# Patient Record
Sex: FEMALE | Race: WHITE | NOT HISPANIC OR LATINO | Employment: FULL TIME | URBAN - METROPOLITAN AREA
[De-identification: names, ages, dates, MRNs, and addresses within clinical notes are randomized per-mention and may not be internally consistent; named-entity substitution may affect disease eponyms.]

---

## 2019-05-31 ENCOUNTER — OFFICE VISIT (OUTPATIENT)
Dept: URGENT CARE | Facility: URGENT CARE | Age: 54
End: 2019-05-31
Payer: COMMERCIAL

## 2019-05-31 VITALS
DIASTOLIC BLOOD PRESSURE: 72 MMHG | OXYGEN SATURATION: 98 % | SYSTOLIC BLOOD PRESSURE: 111 MMHG | RESPIRATION RATE: 12 BRPM | HEART RATE: 62 BPM | WEIGHT: 164 LBS | TEMPERATURE: 98 F

## 2019-05-31 DIAGNOSIS — R30.0 DYSURIA: ICD-10-CM

## 2019-05-31 DIAGNOSIS — R82.90 NONSPECIFIC FINDING ON EXAMINATION OF URINE: ICD-10-CM

## 2019-05-31 DIAGNOSIS — R31.9 URINARY TRACT INFECTION WITH HEMATURIA, SITE UNSPECIFIED: Primary | ICD-10-CM

## 2019-05-31 DIAGNOSIS — N39.0 URINARY TRACT INFECTION WITH HEMATURIA, SITE UNSPECIFIED: Primary | ICD-10-CM

## 2019-05-31 LAB
ALBUMIN UR-MCNC: >=300 MG/DL
APPEARANCE UR: ABNORMAL
BACTERIA #/AREA URNS HPF: ABNORMAL /HPF
BILIRUB UR QL STRIP: ABNORMAL
COLOR UR AUTO: ABNORMAL
GLUCOSE UR STRIP-MCNC: NEGATIVE MG/DL
HGB UR QL STRIP: ABNORMAL
KETONES UR STRIP-MCNC: NEGATIVE MG/DL
LEUKOCYTE ESTERASE UR QL STRIP: ABNORMAL
NITRATE UR QL: POSITIVE
NON-SQ EPI CELLS #/AREA URNS LPF: ABNORMAL /LPF
PH UR STRIP: 6.5 PH (ref 5–7)
RBC #/AREA URNS AUTO: >100 /HPF
SOURCE: ABNORMAL
SP GR UR STRIP: 1.01 (ref 1–1.03)
UROBILINOGEN UR STRIP-ACNC: 0.2 EU/DL (ref 0.2–1)
WBC #/AREA URNS AUTO: ABNORMAL /HPF

## 2019-05-31 PROCEDURE — 99203 OFFICE O/P NEW LOW 30 MIN: CPT | Performed by: PHYSICIAN ASSISTANT

## 2019-05-31 PROCEDURE — 87186 SC STD MICRODIL/AGAR DIL: CPT | Performed by: PHYSICIAN ASSISTANT

## 2019-05-31 PROCEDURE — 81001 URINALYSIS AUTO W/SCOPE: CPT | Performed by: PHYSICIAN ASSISTANT

## 2019-05-31 PROCEDURE — 87086 URINE CULTURE/COLONY COUNT: CPT | Performed by: PHYSICIAN ASSISTANT

## 2019-05-31 PROCEDURE — 87088 URINE BACTERIA CULTURE: CPT | Performed by: PHYSICIAN ASSISTANT

## 2019-05-31 RX ORDER — NITROFURANTOIN 25; 75 MG/1; MG/1
100 CAPSULE ORAL 2 TIMES DAILY
Qty: 14 CAPSULE | Refills: 0 | Status: SHIPPED | OUTPATIENT
Start: 2019-05-31 | End: 2019-06-20

## 2019-05-31 RX ORDER — DIPHENOXYLATE HYDROCHLORIDE AND ATROPINE SULFATE 2.5; .025 MG/1; MG/1
TABLET ORAL
COMMUNITY
Start: 2008-03-26

## 2019-06-01 NOTE — PROGRESS NOTES
S: 52 yo female is here for dysuria, urinary frequency, urinary urgency, and pressure sensation for a couple days.  Blood in her urine today.  LMP years ago.  No fever.  No rash.  No nausea or vomiting.  No flank pain.  No pelvic pain.        No Known Allergies    History reviewed. No pertinent past medical history.     PMHX-not diabetic    FMHX-negative for kidney stones      Current Outpatient Medications on File Prior to Visit:  FLUoxetine (PROZAC) 20 MG capsule    Multiple Vitamin (MULTI-VITAMINS) TABS      No current facility-administered medications on file prior to visit.     Social History     Tobacco Use     Smoking status: Never Smoker     Smokeless tobacco: Never Used   Substance Use Topics     Alcohol use: Not on file       ROS:  CONSTITUTIONAL: Negative for fatigue or fever.  EYES: Negative for eye problems.  ENT: neg.  RESP: neg.  CV: Negative for chest pains.  GI: Negative for vomiting.  MUSCULOSKELETAL:  Negative for significant muscle or joint pains.  NEUROLOGIC: Negative for headaches.  SKIN: Negative for rash.  - as above  Psych- nl mentation    OBJECTIVE:  /72   Pulse 62   Temp 98  F (36.7  C) (Oral)   Resp 12   Wt 74.4 kg (164 lb)   SpO2 98%   GENERAL APPEARANCE: Healthy, alert and no distress.  EYES:Conjunctiva/sclera clear.  THROAT: No erythema w/o tonsillar enlargement . No exudates.  NECK: Supple, nontender, no lymphadenopathy.  RESP: Lungs clear to auscultation - no rales, rhonchi or wheezes  CV: Regular rate and rhythm, normal S1 S2, no murmur noted.  NEURO: Awake, alert    SKIN: No rashes  Abdomen-soft, nontender.  Back-no CVA tenderness  Results for orders placed or performed in visit on 05/31/19   *UA reflex to Microscopic and Culture ([facility] (except [place] and [place])   Result Value Ref Range    Color Urine Brown     Appearance Urine Bloody     Glucose Urine Negative NEG^Negative mg/dL    Bilirubin Urine Small (A) NEG^Negative    Ketones Urine  Negative NEG^Negative mg/dL    Specific Gravity Urine 1.010 1.003 - 1.035    Blood Urine Large (A) NEG^Negative    pH Urine 6.5 5.0 - 7.0 pH    Protein Albumin Urine >=300 (A) NEG^Negative mg/dL    Urobilinogen Urine 0.2 0.2 - 1.0 EU/dL    Nitrite Urine Positive (A) NEG^Negative    Leukocyte Esterase Urine Small (A) NEG^Negative    Source Midstream Urine    Urine Microscopic   Result Value Ref Range    WBC Urine 25-50 (A) OTO5^0 - 5 /HPF    RBC Urine >100 (A) OTO2^O - 2 /HPF    Squamous Epithelial /LPF Urine Few FEW^Few /LPF    Bacteria Urine Few (A) NEG^Negative /HPF         ASSESSMENT:     ICD-10-CM    1. Urinary tract infection with hematuria, site unspecified N39.0 nitroFURantoin macrocrystal-monohydrate (MACROBID) 100 MG capsule    R31.9    2. Dysuria R30.0 *UA reflex to Microscopic and Culture ([facility] (except [place] and [place])     Urine Microscopic   3. Nonspecific finding on examination of urine R82.90 Urine Culture Aerobic Bacterial           PLAN: pending  Lots of rest and fluids.  RETURN TO CLINIC 3 days if any worsening symptoms or if not improving.    Opal Darden PA-C

## 2019-06-02 LAB
BACTERIA SPEC CULT: ABNORMAL
SPECIMEN SOURCE: ABNORMAL

## 2019-06-18 ENCOUNTER — TRANSFERRED RECORDS (OUTPATIENT)
Dept: HEALTH INFORMATION MANAGEMENT | Facility: CLINIC | Age: 54
End: 2019-06-18

## 2019-06-18 LAB — PAP SMEAR - HIM PATIENT REPORTED: NEGATIVE

## 2019-06-20 ENCOUNTER — ANCILLARY PROCEDURE (OUTPATIENT)
Dept: GENERAL RADIOLOGY | Facility: CLINIC | Age: 54
End: 2019-06-20
Attending: PHYSICIAN ASSISTANT
Payer: COMMERCIAL

## 2019-06-20 ENCOUNTER — OFFICE VISIT (OUTPATIENT)
Dept: FAMILY MEDICINE | Facility: CLINIC | Age: 54
End: 2019-06-20
Payer: COMMERCIAL

## 2019-06-20 VITALS
BODY MASS INDEX: 27.32 KG/M2 | WEIGHT: 164 LBS | SYSTOLIC BLOOD PRESSURE: 95 MMHG | DIASTOLIC BLOOD PRESSURE: 61 MMHG | HEIGHT: 65 IN | HEART RATE: 73 BPM | OXYGEN SATURATION: 99 % | TEMPERATURE: 98.2 F | RESPIRATION RATE: 18 BRPM

## 2019-06-20 DIAGNOSIS — S69.90XA FINGER INJURY, UNSPECIFIED LATERALITY, INITIAL ENCOUNTER: ICD-10-CM

## 2019-06-20 DIAGNOSIS — S62.623A DISPLACED FRACTURE OF MIDDLE PHALANX OF LEFT MIDDLE FINGER, INITIAL ENCOUNTER FOR CLOSED FRACTURE: ICD-10-CM

## 2019-06-20 DIAGNOSIS — S62.630A CLOSED DISPLACED FRACTURE OF DISTAL PHALANX OF RIGHT INDEX FINGER, INITIAL ENCOUNTER: Primary | ICD-10-CM

## 2019-06-20 PROCEDURE — 73140 X-RAY EXAM OF FINGER(S): CPT | Mod: RT

## 2019-06-20 PROCEDURE — 73140 X-RAY EXAM OF FINGER(S): CPT | Mod: LT

## 2019-06-20 PROCEDURE — 99214 OFFICE O/P EST MOD 30 MIN: CPT | Performed by: PHYSICIAN ASSISTANT

## 2019-06-20 SDOH — HEALTH STABILITY: MENTAL HEALTH: HOW OFTEN DO YOU HAVE A DRINK CONTAINING ALCOHOL?: NEVER

## 2019-06-20 ASSESSMENT — MIFFLIN-ST. JEOR: SCORE: 1344.78

## 2019-06-20 ASSESSMENT — PAIN SCALES - GENERAL: PAINLEVEL: MILD PAIN (3)

## 2019-06-20 NOTE — Clinical Note
Please abstract the following data from this visit with this patient into the appropriate field in Epic:Colonoscopy done on this date: 2015 , by this group: [facility], results were normal repeat in 10 years. Mammogram done on this date: 04/2019 , by this group: kael Mueller, results were normal. Pap smear done on this date: 6/18/19  by this group: Kael Mueller , results were normal, negative HPV repeat in 5 years.

## 2019-06-20 NOTE — PATIENT INSTRUCTIONS
At [facility], we strive to deliver an exceptional experience to you, every time we see you.  If you receive a survey in the mail, please send us back your thoughts. We really do value your feedback.    Based on your medical history, these are the current health maintenance/preventive care services that you are due for (some may have been done at this visit.)  Health Maintenance Due   Topic Date Due     PREVENTIVE CARE VISIT  1965     HEPATITIS C SCREENING  1965     MAMMO SCREENING  1965     PAP  1965     COLONOSCOPY  11/11/1974     HIV SCREENING  11/11/1979     LIPID  11/11/2009     ZOSTER IMMUNIZATION (1 of 2) 11/11/2014     PHQ-2  01/01/2019         Suggested websites for health information:  Www.Pancetera.inmobly : Up to date and easily searchable information on multiple topics.  Www.medlineplus.gov : medication info, interactive tutorials, watch real surgeries online  Www.familydoctor.org : good info from the Academy of Family Physicians  Www.cdc.gov : public health info, travel advisories, epidemics (H1N1)  Www.aap.org : children's health info, normal development, vaccinations  Www.health.[facility].[place].us : [place] dept of health, public health issues in [place], N1N1    Your care team:                            Family Medicine Internal Medicine   MD Ben Drummond MD Shantel Branch-Fleming, MD Katya Georgiev PA-C Nam Ho, MD Pediatrics   CHRIS Park, MD Preeti Sanchez CNP, MD Deborah Mielke, MD Kim Thein, APRMARLON GONZALEZ      Clinic hours: Monday - Thursday 7 am-7 pm; Fridays 7 am-5 pm.   Urgent care: Monday - Friday 11 am-9 pm; Saturday and Sunday 9 am-5 pm.  Pharmacy : Monday -Thursday 8 am-8 pm; Friday 8 am-6 pm; Saturday and Sunday 9 am-5 pm.     Clinic: [phone]   Pharmacy: [phone]

## 2019-06-20 NOTE — PROGRESS NOTES
"Subjective     Rosalba Arredondo is a 53 year old female who presents to clinic today for the following health issues:    HPI   Concern - Hand injury  Onset: 6/8/19    Description:   Folding metal ladder fell and hit both hands and fingers got stuck.    Intensity: mild    Progression of Symptoms:  improving    Accompanying Signs & Symptoms:  Tingling, swelling, burning to the touch left finger    Previous history of similar problem:   none    Precipitating factors:   Worsened by: pressure    Alleviating factors:  Improved by: splint bought from [facility], ibuprofen    Therapies Tried and outcome: splint bought from [facility], ibuprofen        There is no problem list on file for this patient.    History reviewed. No pertinent surgical history.    Social History     Tobacco Use     Smoking status: Never Smoker     Smokeless tobacco: Never Used   Substance Use Topics     Alcohol use: Yes     Frequency: Never     Comment: occasionally     History reviewed. No pertinent family history.      Current Outpatient Medications   Medication Sig Dispense Refill     FLUoxetine (PROZAC) 20 MG capsule   2     Multiple Vitamin (MULTI-VITAMINS) TABS        No Known Allergies    Reviewed and updated as needed this visit by Provider         Review of Systems   ROS COMP: Constitutional, HEENT, cardiovascular, pulmonary, gi and gu systems are negative, except as otherwise noted.      Objective    BP 95/61 (BP Location: Right arm, Patient Position: Chair, Cuff Size: Adult Regular)   Pulse 73   Temp 98.2  F (36.8  C) (Oral)   Resp 18   Ht 1.651 m (5' 5\")   Wt 74.4 kg (164 lb)   SpO2 99%   BMI 27.29 kg/m    Body mass index is 27.29 kg/m .  Physical Exam   GENERAL: healthy, alert and no distress  MS: right index finger-swelling, erythema, tenderness at the distal phalanx  Left middle finger-swelling erythema tenderness at the middle phalanx, LROMDIP    Diagnostic Test Results:  Labs reviewed in Epic  Xray - independent read-displaced " "fracture of the right index distal phalanx and left middle finger middle phalanx        Assessment & Plan       ICD-10-CM    1. Closed displaced fracture of distal phalanx of right index finger, initial encounter S62.630A XR Finger Left G/E 2 Views     XR Finger Right G/E 2 Views     ORTHO  REFERRAL   2. Displaced fracture of middle phalanx of left middle finger, initial encounter for closed fracture S62.623A ORTHO  REFERRAL   wear splints  Ibuprofen  Icing  Make appointment with ortho as soon as possible      BMI:   Estimated body mass index is 27.29 kg/m  as calculated from the following:    Height as of this encounter: 1.651 m (5' 5\").    Weight as of this encounter: 74.4 kg (164 lb).   Weight management plan: Discussed healthy diet and exercise guidelines            No follow-ups on file.    Ashley Edouard PA-C  [facility]    "

## 2019-06-24 ENCOUNTER — OFFICE VISIT (OUTPATIENT)
Dept: ORTHOPEDICS | Facility: CLINIC | Age: 54
End: 2019-06-24
Payer: COMMERCIAL

## 2019-06-24 VITALS
RESPIRATION RATE: 13 BRPM | OXYGEN SATURATION: 99 % | HEART RATE: 75 BPM | SYSTOLIC BLOOD PRESSURE: 116 MMHG | DIASTOLIC BLOOD PRESSURE: 75 MMHG | BODY MASS INDEX: 27.32 KG/M2 | WEIGHT: 164 LBS | HEIGHT: 65 IN

## 2019-06-24 DIAGNOSIS — S62.660A CLOSED NONDISPLACED FRACTURE OF DISTAL PHALANX OF RIGHT INDEX FINGER, INITIAL ENCOUNTER: ICD-10-CM

## 2019-06-24 DIAGNOSIS — S62.623A DISPLACED FRACTURE OF MIDDLE PHALANX OF LEFT MIDDLE FINGER, INITIAL ENCOUNTER FOR CLOSED FRACTURE: Primary | ICD-10-CM

## 2019-06-24 DIAGNOSIS — S62.623A CLOSED DISPLACED FRACTURE OF MIDDLE PHALANX OF LEFT MIDDLE FINGER, INITIAL ENCOUNTER: ICD-10-CM

## 2019-06-24 PROBLEM — S62.660D CLOSED NONDISPLACED FRACTURE OF DISTAL PHALANX OF RIGHT INDEX FINGER WITH ROUTINE HEALING: Status: ACTIVE | Noted: 2019-06-24

## 2019-06-24 PROCEDURE — 99243 OFF/OP CNSLTJ NEW/EST LOW 30: CPT | Performed by: ORTHOPAEDIC SURGERY

## 2019-06-24 RX ORDER — CEFAZOLIN SODIUM 1 G/50ML
1 INJECTION, SOLUTION INTRAVENOUS SEE ADMIN INSTRUCTIONS
Status: CANCELLED | OUTPATIENT
Start: 2019-06-24

## 2019-06-24 RX ORDER — CEFAZOLIN SODIUM 2 G/50ML
2 SOLUTION INTRAVENOUS
Status: CANCELLED | OUTPATIENT
Start: 2019-06-24

## 2019-06-24 ASSESSMENT — MIFFLIN-ST. JEOR: SCORE: 1344.78

## 2019-06-24 NOTE — LETTER
6/24/2019         RE: Rosalba Arredondo  [address] [place] [address]        Dear Colleague,    Thank you for referring your patient, Rosalba Arredondo, to the [facility]. Please see a copy of my visit note below.    Roslaba Arredondo is a 53 year old female who is seen in consultation at the request of Ashley Edouard PA-C  for right index and left long finger fractures.  She sustained this when her hands were caught in a folding ladder on 6/8/19.  She was hoping she just bruised the fingers, so did not seek medical attention. She has constant pain in the fingers rated 4/10. She is right handed.    X-ray 6/20/19 showed the fractures.  Right index distal phalanx fracture is nondisplaced.  Left long finger middle phalanx  fracture is displaced with the shaft blocking the DIP range of motion.    History reviewed. No pertinent past medical history.    History reviewed. No pertinent surgical history.    History reviewed. No pertinent family history.    Social History     Socioeconomic History     Marital status:      Spouse name: Not on file     Number of children: Not on file     Years of education: Not on file     Highest education level: Not on file   Occupational History     Not on file   Social Needs     Financial resource strain: Not on file     Food insecurity:     Worry: Not on file     Inability: Not on file     Transportation needs:     Medical: Not on file     Non-medical: Not on file   Tobacco Use     Smoking status: Never Smoker     Smokeless tobacco: Never Used   Substance and Sexual Activity     Alcohol use: Yes     Frequency: Never     Comment: occasionally     Drug use: Never     Sexual activity: Yes     Partners: Male     Birth control/protection: None   Lifestyle     Physical activity:     Days per week: Not on file     Minutes per session: Not on file     Stress: Not on file   Relationships     Social connections:     Talks on phone: Not on file     Gets together: Not on  "file     Attends Denominational service: Not on file     Active member of club or organization: Not on file     Attends meetings of clubs or organizations: Not on file     Relationship status: Not on file     Intimate partner violence:     Fear of current or ex partner: Not on file     Emotionally abused: Not on file     Physically abused: Not on file     Forced sexual activity: Not on file   Other Topics Concern     Not on file   Social History Narrative     Not on file       Current Outpatient Medications   Medication Sig Dispense Refill     FLUoxetine (PROZAC) 20 MG capsule   2     Multiple Vitamin (MULTI-VITAMINS) TABS          No Known Allergies    REVIEW OF SYSTEMS:  CONSTITUTIONAL:  NEGATIVE for fever, chills, change in weight, not feeling tired  SKIN:  NEGATIVE for worrisome rashes, no skin lumps, no skin ulcers and no non-healing wounds  EYES:  NEGATIVE for vision changes or irritation.  ENT/MOUTH:  NEGATIVE.  No hearing loss, no hoarseness, no difficulty swallowing.  RESP:  NEGATIVE. No cough or shortness of breath.  CV:  NEGATIVE for chest pain, palpitations or peripheral edema  GI:  NEGATIVE for nausea, abdominal pain, heartburn, or change in bowel habits  :  Negative. No dysuria, no hematuria  MUSCULOSKELETAL:  See HPI above  NEURO:  NEGATIVE . No headaches, no dizziness,  no numbness  ENDOCRINE:  NEGATIVE for temperature intolerance, skin/hair changes  HEME/ALLERGY/IMMUNE:  NEGATIVE for bleeding problems  PSYCHIATRIC:  NEGATIVE. no anxiety, no depression.     Exam:  Vitals: /75   Pulse 75   Resp 13   Ht 1.651 m (5' 5\")   Wt 74.4 kg (164 lb)   SpO2 99%   BMI 27.29 kg/m     BMI= Body mass index is 27.29 kg/m .  Constitutional:  healthy, alert and no distress  Neuro: Alert and Oriented x 3, Sensation grossly WNL.  Psych: Affect normal   Respiratory: Breathing not labored.  Cardiovascular: normal peripheral pulses  Lymph: no adenopathy  Skin: No rashes,worrisome lesions or skin problems  Right " index has tenderness at distal phalanx.  No deformity.  Left long finger has swan neck deformity.  I can extend the DIP, but she has limited flexion at DIP.  Lacks good active control.  Very tender at middle phalanx.    Assessment:    1.  Right index distal phalanx fracture in good position and healing well.  She can stop this splint in 1 week.  2.  Left long finger middle phalanx fracture is displaced and with deformity.  At 16 days this already has early healing with malunion.  I feel it requires open-reduction, internal fixation to gain good range of motion.  We discussed my performing this or referring to a hand surgeon.  Since it has significant early healing, I feel referral is warranted and preferable.  Dr Rafael Jones has agreed to see her.    Again, thank you for allowing me to participate in the care of your patient.        Sincerely,        Sami Bee MD

## 2019-06-25 ENCOUNTER — TELEPHONE (OUTPATIENT)
Dept: ORTHOPEDICS | Facility: CLINIC | Age: 54
End: 2019-06-25

## 2019-06-25 ENCOUNTER — TELEPHONE (OUTPATIENT)
Dept: FAMILY MEDICINE | Facility: CLINIC | Age: 54
End: 2019-06-25

## 2019-06-25 NOTE — PROGRESS NOTES
Rosalba Arredondo is a 53 year old female who is seen in consultation at the request of Ashley Edouard PA-C  for right index and left long finger fractures.  She sustained this when her hands were caught in a folding ladder on 6/8/19.  She was hoping she just bruised the fingers, so did not seek medical attention. She has constant pain in the fingers rated 4/10. She is right handed.    X-ray 6/20/19 showed the fractures.  Right index distal phalanx fracture is nondisplaced.  Left long finger middle phalanx  fracture is displaced with the shaft blocking the DIP range of motion.    History reviewed. No pertinent past medical history.    History reviewed. No pertinent surgical history.    History reviewed. No pertinent family history.    Social History     Socioeconomic History     Marital status:      Spouse name: Not on file     Number of children: Not on file     Years of education: Not on file     Highest education level: Not on file   Occupational History     Not on file   Social Needs     Financial resource strain: Not on file     Food insecurity:     Worry: Not on file     Inability: Not on file     Transportation needs:     Medical: Not on file     Non-medical: Not on file   Tobacco Use     Smoking status: Never Smoker     Smokeless tobacco: Never Used   Substance and Sexual Activity     Alcohol use: Yes     Frequency: Never     Comment: occasionally     Drug use: Never     Sexual activity: Yes     Partners: Male     Birth control/protection: None   Lifestyle     Physical activity:     Days per week: Not on file     Minutes per session: Not on file     Stress: Not on file   Relationships     Social connections:     Talks on phone: Not on file     Gets together: Not on file     Attends Jewish service: Not on file     Active member of club or organization: Not on file     Attends meetings of clubs or organizations: Not on file     Relationship status: Not on file     Intimate partner violence:     Fear  "of current or ex partner: Not on file     Emotionally abused: Not on file     Physically abused: Not on file     Forced sexual activity: Not on file   Other Topics Concern     Not on file   Social History Narrative     Not on file       Current Outpatient Medications   Medication Sig Dispense Refill     FLUoxetine (PROZAC) 20 MG capsule   2     Multiple Vitamin (MULTI-VITAMINS) TABS          No Known Allergies    REVIEW OF SYSTEMS:  CONSTITUTIONAL:  NEGATIVE for fever, chills, change in weight, not feeling tired  SKIN:  NEGATIVE for worrisome rashes, no skin lumps, no skin ulcers and no non-healing wounds  EYES:  NEGATIVE for vision changes or irritation.  ENT/MOUTH:  NEGATIVE.  No hearing loss, no hoarseness, no difficulty swallowing.  RESP:  NEGATIVE. No cough or shortness of breath.  CV:  NEGATIVE for chest pain, palpitations or peripheral edema  GI:  NEGATIVE for nausea, abdominal pain, heartburn, or change in bowel habits  :  Negative. No dysuria, no hematuria  MUSCULOSKELETAL:  See HPI above  NEURO:  NEGATIVE . No headaches, no dizziness,  no numbness  ENDOCRINE:  NEGATIVE for temperature intolerance, skin/hair changes  HEME/ALLERGY/IMMUNE:  NEGATIVE for bleeding problems  PSYCHIATRIC:  NEGATIVE. no anxiety, no depression.     Exam:  Vitals: /75   Pulse 75   Resp 13   Ht 1.651 m (5' 5\")   Wt 74.4 kg (164 lb)   SpO2 99%   BMI 27.29 kg/m    BMI= Body mass index is 27.29 kg/m .  Constitutional:  healthy, alert and no distress  Neuro: Alert and Oriented x 3, Sensation grossly WNL.  Psych: Affect normal   Respiratory: Breathing not labored.  Cardiovascular: normal peripheral pulses  Lymph: no adenopathy  Skin: No rashes,worrisome lesions or skin problems  Right index has tenderness at distal phalanx.  No deformity.  Left long finger has swan neck deformity.  I can extend the DIP, but she has limited flexion at DIP.  Lacks good active control.  Very tender at middle phalanx.    Assessment:    1.  Right " index distal phalanx fracture in good position and healing well.  She can stop this splint in 1 week.  2.  Left long finger middle phalanx fracture is displaced and with deformity.  At 16 days this already has early healing with malunion.  I feel it requires open-reduction, internal fixation to gain good range of motion.  We discussed my performing this or referring to a hand surgeon.  Since it has significant early healing, I feel referral is warranted and preferable.  Dr Rafael Jones has agreed to see her.

## 2019-06-25 NOTE — TELEPHONE ENCOUNTER
Called and left VM with callback to discuss possible surgery and pre-op packet.     *Pt called back.     Discussed surgery, recovery, pre-op packet. All questions answered at this time. She is going to try to get her OBGYN records from [facility] in [place] to [place]. I advised that she will likely still need a separate appt for pre-op physical unless [facility] is willing to addend her OV note from 6/20/19 clearing her for surgery. Pt was agreeable with plan.    She or someone else will come  packet at [facility] tonight.     Adebayo Fernandez RN

## 2019-06-25 NOTE — TELEPHONE ENCOUNTER
Date Scheduled: 6-28-19  Facility: [facility]  Surgeon: Dr. Jones   Post-op appointment scheduled:   Next 5 appointments (look out 90 days)    Jul 12, 2019  3:30 PM CDT  Return Visit with Rafael Jones MD  [facility] [address] [address] [place] [id number] [phone]        6 week scheduled at the [facility] due to Dr. Jones not being in [facility] that week.   scheduled?: No  Surgery packet/instructions confirmed received?  Yes  Special Considerations:   Catherine Yanes, Surgery Scheduling Coordinator

## 2019-06-25 NOTE — TELEPHONE ENCOUNTER
Procedure: Closed reduction percutaneous pinning vs open reduction internal fixation of left long finger  Facility:  or [facility]  Length: 60 minute(s)  Surgeon: Karen GARCIA  Anesthesia: General and Block  BMI: There is no height or weight on file to calculate BMI. (If over 43 for general or 45 for MAC cannot be scheduled at [facility])   Pre-op Appointments needed: H&P within 30 days of surgery  Post-op appointments needed: 2 weeks provider only, 6 weeks provider only   needed:  No  Surgery packet/instructions given to patient?  To be mailed    Adebayo Fernandez RN

## 2019-06-25 NOTE — TELEPHONE ENCOUNTER
Reason for Call:  Other     Detailed comments: patient was seen on 6/20/2019 and patient states she was told by the surgeon that is doing surgery on 6/28/2019, if the information from that appointment on the 20th could clear her for surgery?    Phone Number Patient can be reached at: Home number on file [phone] (home)    Best Time: any    Can we leave a detailed message on this number? YES    Call taken on 6/25/2019 at 4:52 PM by Kristen Jo

## 2019-06-25 NOTE — PATIENT INSTRUCTIONS
[facility]  [address] [place] [address]  Phone [phone]  Fax [phone]    SURGICAL INFORMATION & INSTRUCTIONS  Dr. Rafael Jones  Name of Surgery: Closed reduction percutaneous pinning vs open reduction internal fixation of left long finger    Date of Surgery: 6/28/19    If you need to reschedule/schedule your surgery, please contact Catherine, our surgery scheduler at [facility], at [phone].    Arrival Time:     Time of Surgery:      Please arrive early so that we can prepare you for surgery. If you arrive later than your scheduled arrival time, your surgery may be cancelled.  Please note that scheduled times may change, but you will always be notified if there is a change.       Location of Surgery:     ? [facility]  [address] [address] [place] [address]  2nd floor check-in  Phone [phone]  Fax [phone]  [email or web address]    Prior to surgery    ? Medical Leave Forms  Please fax any medical leave forms from your employer/school to [phone] (if seen in [facility]). It can take up to 5 business days to complete the forms. We will fax them back to the number listed on the forms, if you would like a copy, please let us know and we will mail a copy to you. Do not bring with on day of surgery as the forms may get lost.    ? Call your insurance company  Ask if you need pre-approval for your surgery.  If pre-approval is needed, please call our surgery scheduler for assistance with the pre-approval process.   If you do not have insurance, please let us know.     ? Schedule an appointment with a Primary Care Provider for a Pre-Op Physical.  This should be done within 30 days of surgery  If you do not have a primary care provider, you may call [facility] at [phone], for an appointment.  Please have your office note and any labs or tests faxed to the facility where you are having surgery. Please be sure to request a copy of  your pre-op physical and bring it with you on the day of surgery.      Tell your provider if you have any of the following:   - IF you have a pacemaker or an ICD (implanted cardiac defibrillator). If you do, please bring the ID card with you on the day of surgery  - IF you're a smoker. People who smoke have a higher risk of infection after surgery. Ask your provider how you can quit smoking.  - If you have diabetes, work with your provider to control your blood sugar. If its not well-controlled, we may need to delay surgery (or you may have problems healing afterward).  - If your surgeon asks you to see your dentist: You'll need to complete any dental work before surgery. Your dentist must send a letter to your surgery center saying it's ok to do the surgery.    ? Pre-Op Phone Call  You will receive a pre-op phone call 1-3 days before surgery to review your eating and drinking restrictions, review medications, and confirm surgery times.      ? 7-10 days BEFORE surgery  ? Stop taking aspirin, Plavix or aspirin products 10 days before surgery or as directed by your doctor.  ? Stop taking non-steroidal anti-inflammatory medications (naproxen/Aleve, ibuprofen/Advil/Motrin, celecoxib/Celebrex, meloxicam/Mobic) 3 days before surgery or as directed by your surgeon.  This will reduce the risk of bleeding during surgery.  ? Stop taking fish oil (Omega-3-fatty acid) 1 week before surgery.  ? It is OK to take acetaminophen (Tylenol) up until 2 hours prior to surgery.  ? Take prescription medications as directed by your doctor.  Discuss which medications to take or hold prior to your surgery, with your primary care doctor.    ? If you have diabetes, ask your primary care doctor or endocrinologist how you should take your medication(s).    ? 24 hours BEFORE surgery  Stop drinking alcohol (beer, wine, liquor) at least 24-hours before and after your surgery.     ? Evening BEFORE surgery  - You may eat a normal meal the night  before surgery, but eat nothing after midnight.   - Dress in freshly washed clothes or pajamas. Use fresh pillowcases and sheets on your bed.    ? Day of Surgery  - You may drink clear liquids up to 2 hours before surgery or as directed by your surgeon.  Clear liquids include: Water, Pedialyte, Gatorade, apple juice and liquids you can read through. Please avoid liquids that are red or orange in color.   - Do NOT drink: milk, coffee, liquids that have pulp, orange juice, and lemonade or tomato juice.   - Do NOT chew gum, chew tobacco or suck on hard candy.  - Dress in freshly washed clothes.  - Do not wear deodorant, cologne, lotion, makeup, nail polish or jewelry to surgery.    ? If there is any change in your health PRIOR to your surgery, please contact your surgeon's office.  Such as a fever, body aches, fatigue, any flu-like symptoms, rash, or any new injury to related body part.    ? Arrange for someone to drive you home after surgery.    will need to be a responsible adult (18 years or older) that will provide transportation to and from surgery and stay in the waiting room during your surgery. You may not drive yourself or take public transportation to and from surgery.    ? Arrange for someone to stay with you for 24 hours after you go home.   This person must be a responsible adult (18 years or older).    ? Bring these items to the hospital/surgery center:   ? Insurance card(s)  ? Money for parking and co-pays, if needed  ? A list of all the medications you take, including vitamins, minerals, herbs and over the counter medications.    ? A copy of your Advance Health Care Directive, if you have one.  This tells us what treatment(s) you would or would not want, and who would make health care decisions, if you could no longer speak for yourself.    ? A case for glasses, contact lenses, hearing aids or dentures.   ? Your inhaler or CPAP machine, if you use these at home    ? Leave extra cash, jewelry and  other valuables at home.       ? Other information:   Sleep Apnea: Let your nurse know if you have a history of sleep apnea, only if you are having surgery at the [facility].    When you arrive for surgery  When you get to the surgery center/hospital, you will:  - Check in. If you are under age 18, you must be with a parent or legal guardian.  - Sign consent forms, if you haven t already. These forms state that you know the risks and benefits of surgery. When you sign the forms, you give us permission to do the surgery. Do not sign them unless you understand what will happen during and after your surgery. If you have any questions about your surgery, ask to speak with your doctor before you sign the forms. If you don t understand the answers, ask again.  - Receive a copy of the Patient s Bill of Rights. If you do not receive a copy, please ask for one.  - Change into hospital clothes. Your belongings will be placed in a bag. We will return them to you after surgery.  - Meet with the anesthesia provider. He or she will tell you what kind of anesthesia (medicine) will be used to keep you comfortable during surgery.  - Remember: it s okay to remind doctors and nurses to wash their hands before touching you.  - In most cases, your surgeon will use a marker to write his or her initials on the surgery site. This ensures that the exact site is operated on.  - For safety reasons, we will ask you the same questions many times. For example, we may ask your name and birth date over and over again.  - Friends and family can stay with you until it s time for surgery. While you re in surgery, they will be in the waiting area. Please note that cell phones are not allowed in some patient care areas.  - If you have questions about what will happen in the operating room, talk to your care team.  - You will meet with an anesthesiologist, before your surgery.  He or she may reference types of anesthesia commonly used  for surgeries:   o General:  This involves the use of an IV for injection of medication and anesthesia. You are put into a sleep and have a breathing tube to assist you with breathing.  o Sedation:  You are asleep, but not so deply that you need a breathing tube.   o Local or Regional: a nerve is injected to numb the surgical area.  o Spinal: you are numbed from the waist down with medicine injected into your back.  o Femoral Nerve Block:  Anesthesia injected into the groin of leg which you are having surgery on.      After surgery  We will move you to a recovery room, where we will watch you closely. If you have any pain or discomfort, tell your nurse. He or she will try to make you comfortable.    - If you are going home, we will move you to another room. Friends and family may be able to join you. The length of time you spend in recovery depend on the type of medicine you received, your medical condition, the type of surgery you had, or your response to the anesthesia given during your procedure.  - When you are discharged from the recovery room, the nurses will review instructions with you and your caregiver.  - Please wash your hands every time you touch the wound or change bandages or dressings.  - Do not submerge the wound in water.  You may not use a bathtub or hot tub until the wound is closed. The wait time frame is generally 2-3 weeks, but any open area can be a source of incoming bacteria, so it is better to be on the safe side and avoid water submersion until your wound is fully healed.  Keep all dressings clean and dry.   - If you had surgery on your arm or leg, elevate it as much as possible to help reduce swelling.  - You may put ice on the surgical area for comfort, keeping ice on area for up to 20 minutes then off for 40 minutes.  You may do this the first few days after surgery to help reduce pain and swelling.   - Many surgical wounds will have small white strips of tape on them called  steri-strips. These are to help support the incision and surrounding skin. Do not remove these. The edges will curl and fall off on their own, typically within 7-10 days with normal showering and hygiene.   - Drink at least 8-10 glasses of liquid each day to help your body heal.  - Keep your lungs clear by coughing and taking deep breaths every couple hours.  This is especially important the first 48 hours after surgery.    - Notify your doctor if you have any of the following:   o Fever of 101 F or higher  o Numbness and/or tingling  o Increased pain, redness or swelling  o Drainage from wound  o Prolonged or uncontrolled bleeding  o Difficulty with movement    Follow-up Appointments, in Clinic  If you don't already have an appointment scheduled, please call to set up an appointment at [phone].      ? Post-Op appointments with provider (2 and 6 weeks post op)  ? Hand Therapy appointment ([phone])    Dealing with pain  A nurse will check your comfort level often during your stay. He or she will work with you to manage your pain.  It s expected that you will have pain after surgery.  Our goal is to reduce or minimize pain by:   - Remember pain is real. There are many ways to control pain. We can help you decide what works best for you.  - Ask for pain medicine when you need it.  Don t try to  tough it out --this can make you feel worse. Always take your medicine as ordered.  - Medicine doesn t work the same for everyone. If your medicine isn t working, tell your nurse. There may be other medicines or treatments we can try.  - Medication Refills.  If you need refills on your pain medication, please call the clinic as soon as possible.  It may take 72-business hours to obtain a refill.  Refills must be picked up at check-in 2, [facility] or mailed to a pharmacy of your choice.    - It is expected that you will wean off the pain medications in a timely manner.   - Constipation is a common  side effect of pain medication, decreased activity and anesthesia from surgery.  Take a stool softener as prescribed by your doctor at the time of discharge.  You may also use over the counter medications as needed.  Be sure to increase your fiber (fruits and vegetables) and your water intake.      Please call the clinic at [phone], if you experience any problems or have questions.  If you are having an emergency, always call 911 or seek immediate evaluation at the Emergency Room.    Thank you for selecting the [facility] for your care!  ---------------------------------------------

## 2019-06-26 NOTE — TELEPHONE ENCOUNTER
Called and left msg for patient she will need a pre-op appointment, patient may call our appointment line and schedule one ASAP or patient may come in to our walk in clinic and get this pre-op done.  Juan Guillaume,  [facility]

## 2019-06-27 ENCOUNTER — ANESTHESIA EVENT (OUTPATIENT)
Dept: SURGERY | Facility: AMBULATORY SURGERY CENTER | Age: 54
End: 2019-06-27

## 2019-06-27 ENCOUNTER — OFFICE VISIT (OUTPATIENT)
Dept: FAMILY MEDICINE | Facility: CLINIC | Age: 54
End: 2019-06-27
Payer: COMMERCIAL

## 2019-06-27 VITALS
HEART RATE: 79 BPM | OXYGEN SATURATION: 98 % | RESPIRATION RATE: 16 BRPM | TEMPERATURE: 98.1 F | SYSTOLIC BLOOD PRESSURE: 104 MMHG | WEIGHT: 165 LBS | BODY MASS INDEX: 27.49 KG/M2 | HEIGHT: 65 IN | DIASTOLIC BLOOD PRESSURE: 66 MMHG

## 2019-06-27 DIAGNOSIS — Z01.818 PREOP GENERAL PHYSICAL EXAM: Primary | ICD-10-CM

## 2019-06-27 DIAGNOSIS — S62.660K: ICD-10-CM

## 2019-06-27 DIAGNOSIS — S62.623K: ICD-10-CM

## 2019-06-27 PROCEDURE — 99214 OFFICE O/P EST MOD 30 MIN: CPT | Performed by: PHYSICIAN ASSISTANT

## 2019-06-27 RX ORDER — TRAZODONE HYDROCHLORIDE 50 MG/1
TABLET, FILM COATED ORAL
Refills: 0 | COMMUNITY
Start: 2019-06-26 | End: 2020-01-13

## 2019-06-27 ASSESSMENT — MIFFLIN-ST. JEOR: SCORE: 1349.32

## 2019-06-27 ASSESSMENT — PAIN SCALES - GENERAL: PAINLEVEL: MILD PAIN (3)

## 2019-06-27 NOTE — PATIENT INSTRUCTIONS
Before Your Surgery      Call your surgeon if there is any change in your health. This includes signs of a cold or flu (such as a sore throat, runny nose, cough, rash or fever).    Do not smoke, drink alcohol or take over the counter medicine (unless your surgeon or primary care doctor tells you to) for the 24 hours before and after surgery.    If you take prescribed drugs: Follow your doctor s orders about which medicines to take and which to stop until after surgery.    Eating and drinking prior to surgery: follow the instructions from your surgeon    Take a shower or bath the night before surgery. Use the soap your surgeon gave you to gently clean your skin. If you do not have soap from your surgeon, use your regular soap. Do not shave or scrub the surgery site.  Wear clean pajamas and have clean sheets on your bed.     At [facility], we strive to deliver an exceptional experience to you, every time we see you.  If you receive a survey in the mail, please send us back your thoughts. We really do value your feedback.    Based on your medical history, these are the current health maintenance/preventive care services that you are due for (some may have been done at this visit.)  Health Maintenance Due   Topic Date Due     PREVENTIVE CARE VISIT  1965     HEPATITIS C SCREENING  1965     HIV SCREENING  11/11/1979     LIPID  11/11/2009     ZOSTER IMMUNIZATION (1 of 2) 11/11/2014     PHQ-2  01/01/2019         Suggested websites for health information:  Www.[place].org : Up to date and easily searchable information on multiple topics.  Www.medlineplus.gov : medication info, interactive tutorials, watch real surgeries online  Www.familydoctor.org : good info from the Academy of Family Physicians  Www.cdc.gov : public health info, travel advisories, epidemics (H1N1)  Www.aap.org : children's health info, normal development, vaccinations  Www.health.state.[place].us : [place] dept of health, public  health issues in [place], N1N1    Your care team:                            Family Medicine Internal Medicine   MD Ben Drummond MD Shantel Branch-Fleming, MD Katya Georgiev PA-C Nam Ho, MD Pediatrics   CHRIS Park, CARLOS Ghosh APRN MD Preeti Weiss MD Deborah Mielke, MD Kim Thein, APRN [facility]      Clinic hours: Monday - Thursday 7 am-7 pm; Fridays 7 am-5 pm.   Urgent care: Monday - Friday 11 am-9 pm; Saturday and Sunday 9 am-5 pm.  Pharmacy : Monday -Thursday 8 am-8 pm; Friday 8 am-6 pm; Saturday and Sunday 9 am-5 pm.     Clinic: [phone]   Pharmacy: [phone]

## 2019-06-27 NOTE — PROGRESS NOTES
[address] [address]  [phone]  Dept: [phone]    PRE-OP EVALUATION:  Today's date: 2019    Rosalba Arredondo (: 1965) presents for pre-operative evaluation assessment as requested by Dr. Jones.  She requires evaluation and anesthesia risk assessment prior to undergoing surgery/procedure for treatment of  nonhealing left middle finger fracture.    Proposed Surgery/ Procedure:CLOSED REDUCTION, HAND, WITH PERCUTANEOUS PINNING VS/OR (Left Finger) OPEN REDUCTION INTERNAL FIXATION, FINGER (Left Finger)    Date of Surgery/ Procedure: 2019  Time of Surgery/ Procedure: 9:15 AM  Hospital/Surgical Facility: [facility]  Fax number for surgical facility:   Primary Physician: No Ref-Primary, Physician  Type of Anesthesia Anticipated: General and Local    Patient has a Health Care Directive or Living Will:  YES living will     1. NO - Do you have a history of heart attack, stroke, stent, bypass or surgery on an artery in the head, neck, heart or legs?  2. NO - Do you ever have any pain or discomfort in your chest?  3. NO - Do you have a history of  Heart Failure?  4. NO - Are you troubled by shortness of breath when: walking on the level, up a slight hill or at night?  5. NO - Do you currently have a cold, bronchitis or other respiratory infection?  6. NO - Do you have a cough, shortness of breath or wheezing?  7. NO - Do you sometimes get pains in the calves of your legs when you walk?  8. NO - Do you or anyone in your family have previous history of blood clots?  9. NO - Do you or does anyone in your family have a serious bleeding problem such as prolonged bleeding following surgeries or cuts?  10. YES - HAVE YOU EVER HAD PROBLEMS WITH ANEMIA OR BEEN TOLD TO TAKE IRON PILLS? Distantly, Gave blood two weeks ago and hgb was 12.9  per pr  11. NO - Have you had any abnormal blood loss such as black, tarry or bloody stools, or abnormal  vaginal bleeding?  12. NO - Have you ever had a blood transfusion?  13. NO - Have you or any of your relatives ever had problems with anesthesia?  14. NO - Do you have sleep apnea, excessive snoring or daytime drowsiness?  15. NO - Do you have any prosthetic heart valves?  16. NO - Do you have prosthetic joints?  17. NO - Is there any chance that you may be pregnant?      HPI:     HPI related to upcoming procedure: She sustained  Fractures to left middle and rt 2nd didigt when her hands were caught in a folding ladder on 6/8/19. Only the Left is not healing as expected requiring sx.       See problem list for active medical problems.  Problems all longstanding and stable, except as noted/documented.  See ROS for pertinent symptoms related to these conditions.      MEDICAL HISTORY:     Patient Active Problem List    Diagnosis Date Noted     Closed nondisplaced fracture of distal phalanx of right index finger 06/24/2019     Priority: Medium     Closed displaced fracture of middle phalanx of left middle finger 06/24/2019     Priority: Medium      History reviewed. No pertinent past medical history.  History reviewed. No pertinent surgical history.  Current Outpatient Medications   Medication Sig Dispense Refill     FLUoxetine (PROZAC) 20 MG capsule   2     Multiple Vitamin (MULTI-VITAMINS) TABS        Progesterone Micronized (PROGESTERONE PO)        traZODone (DESYREL) 50 MG tablet   0     OTC products: None, except as noted above    No Known Allergies   Latex Allergy: NO    Social History     Tobacco Use     Smoking status: Never Smoker     Smokeless tobacco: Never Used   Substance Use Topics     Alcohol use: Yes     Frequency: Never     Comment: occasionally     History   Drug Use Unknown       REVIEW OF SYSTEMS:   CONSTITUTIONAL: NEGATIVE for fever, chills, change in weight  INTEGUMENTARY/SKIN: NEGATIVE for worrisome rashes, moles or lesions  EYES: NEGATIVE for vision changes or irritation  ENT/MOUTH: NEGATIVE for  "ear, mouth and throat problems  RESP: NEGATIVE for significant cough or SOB  BREAST: NEGATIVE for masses, tenderness or discharge  CV: NEGATIVE for chest pain, palpitations or peripheral edema  GI: NEGATIVE for nausea, abdominal pain, heartburn, or change in bowel habits  : NEGATIVE for frequency, dysuria, or hematuria  MUSCULOSKELETAL:POSITIVE  for fxs as above  NEURO: NEGATIVE for weakness, dizziness or paresthesias  ENDOCRINE: NEGATIVE for temperature intolerance, skin/hair changes  HEME: NEGATIVE for bleeding problems  PSYCHIATRIC: NEGATIVE for changes in mood or affect    EXAM:   /66 (BP Location: Left arm, Patient Position: Chair, Cuff Size: Adult Regular)   Pulse 79   Temp 98.1  F (36.7  C) (Oral)   Resp 16   Ht 1.651 m (5' 5\")   Wt 74.8 kg (165 lb)   SpO2 98%   BMI 27.46 kg/m      GENERAL APPEARANCE: healthy, alert and no distress     EYES: EOMI, PERRL     HENT: ear canals and TM's normal and nose and mouth without ulcers or lesions     NECK: no adenopathy, no asymmetry, masses, or scars and thyroid normal to palpation     RESP: lungs clear to auscultation - no rales, rhonchi or wheezes     CV: regular rates and rhythm, normal S1 S2, no S3 or S4 and no murmur, click or rub     ABDOMEN:  soft, nontender, no HSM or masses and bowel sounds normal     MS: digits as above are splinted     SKIN: no suspicious lesions or rashes     NEURO: Normal strength and tone, sensory exam grossly normal, mentation intact and speech normal     PSYCH: mentation appears normal. and affect normal/bright     LYMPHATICS: No cervical adenopathy    DIAGNOSTICS:   EKG: Not indicated due to non-vascular surgery and low risk of event (age <65 and without cardiac risk factors)    No results for input(s): HGB, PLT, INR, NA, POTASSIUM, CR, A1C in the last 16192 hours.     IMPRESSION:    right index and left long finger fractures.    Proposed Surgery/ Procedure:CLOSED REDUCTION, HAND, WITH PERCUTANEOUS PINNING VS/OR (Left " Finger) OPEN REDUCTION INTERNAL FIXATION, FINGER (Left Finger)      The proposed surgical procedure is considered INTERMEDIATE to LOW  risk.    REVISED CARDIAC RISK INDEX  The patient has the following serious cardiovascular risks for perioperative complications such as (MI, PE, VFib and 3  AV Block):  No serious cardiac risks  INTERPRETATION: 0 risks: Class I (very low risk - 0.4% complication rate)    The patient has the following additional risks for perioperative complications:  No identified additional risks      ICD-10-CM    1. Preop general physical exam Z01.818    2. 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 Closed displaced fracture of middle phalanx of left middle finger with nonunion, subsequent encounter S62.658W        RECOMMENDATIONS:          --Patient is to take all scheduled medications on the day of surgery EXCEPT for modifications listed below.    APPROVAL GIVEN to proceed with proposed procedure, without further diagnostic evaluation       Signed Electronically by: JEANETTE Chavez    Agree with above clinical findings, assessment and plan.    Signed Electronically by: Ben Bazan MD (Internal Medicine)    Copy of this evaluation report is provided to requesting physician.    [facility] Preop Guidelines    Revised Cardiac Risk Index

## 2019-06-27 NOTE — ANESTHESIA PREPROCEDURE EVALUATION
Anesthesia Pre-Procedure Evaluation    Patient: Rosalba Arredondo   MRN:     [id number] Gender:   female   Age:    53 year old :      1965        Preoperative Diagnosis: LEFT LONG FINGER MIDDLE PHALANX FRACTURE   Procedure(s):  CLOSED REDUCTION, HAND, WITH PERCUTANEOUS PINNING VS/OR  OPEN REDUCTION INTERNAL FIXATION, FINGER     No past medical history on file.   History reviewed. No pertinent surgical history.       Anesthesia Evaluation     . Pt has had prior anesthetic. Type: General    - motion sickness        ROS/MED HX    ENT/Pulmonary:  - neg pulmonary ROS     Neurologic:  - neg neurologic ROS     Cardiovascular:  - neg cardiovascular ROS       METS/Exercise Tolerance:     Hematologic:  - neg hematologic  ROS       Musculoskeletal:  - neg musculoskeletal ROS       GI/Hepatic:  - neg GI/hepatic ROS       Renal/Genitourinary:  - ROS Renal section negative       Endo:  - neg endo ROS       Psychiatric:  - neg psychiatric ROS       Infectious Disease:  - neg infectious disease ROS       Malignancy:      - no malignancy   Other:    - neg other ROS                     PHYSICAL EXAM:   Mental Status/Neuro: A/A/O   Airway: Facies: Feasible  Mallampati: I  Mouth/Opening: Full  TM distance: > 6 cm  Neck ROM: Full   Respiratory: Auscultation: CTAB     Resp. Rate: Normal     Resp. Effort: Normal      CV: Rhythm: Regular  Rate: Age appropriate  Heart: Normal Sounds   Comments:      Dental: Normal                  No results found for: WBC, HGB, HCT, PLT, CRP, SED, NA, POTASSIUM, CHLORIDE, CO2, BUN, CR, GLC, KOSTAS, PHOS, MAG, ALBUMIN, PROTTOTAL, ALT, AST, GGT, ALKPHOS, BILITOTAL, BILIDIRECT, LIPASE, AMYLASE, DEISY, PTT, INR, FIBR, TSH, T4, T3, HCG, HCGS, CKTOTAL, CKMB, TROPN    Preop Vitals  BP Readings from Last 3 Encounters:   19 116/75   19 95/61   19 111/72    Pulse Readings from Last 3 Encounters:   19 75   19 73   19 62      Resp Readings from Last 3 Encounters:   19 13  "  06/20/19 18   05/31/19 12    SpO2 Readings from Last 3 Encounters:   06/24/19 99%   06/20/19 99%   05/31/19 98%      Temp Readings from Last 1 Encounters:   06/20/19 98.2  F (36.8  C) (Oral)    Ht Readings from Last 1 Encounters:   06/24/19 1.651 m (5' 5\")      Wt Readings from Last 1 Encounters:   06/24/19 74.4 kg (164 lb)    Estimated body mass index is 27.29 kg/m  as calculated from the following:    Height as of 6/24/19: 1.651 m (5' 5\").    Weight as of 6/24/19: 74.4 kg (164 lb).     LDA:            Assessment:   ASA SCORE: 1       Documentation: H&P complete; Preop Testing complete; Consents complete   Proceeding: Proceed without further delay  Tobacco Use:  NO Active use of Tobacco/UNKNOWN Tobacco use status     Plan:   Anes. Type:  General   Pre-Induction: Midazolam IV; Acetaminophen PO   Induction:  IV (Standard)   Airway: LMA   Access/Monitoring: PIV   Maintenance: IV; Propofol   Emergence: Procedure Site   Logistics: Same Day Surgery     Postop Pain/Sedation Strategy:  Standard-Options: Opioids PRN; LA by Surgeon     PONV Management:  Adult Risk Factors: Female, Non-Smoker, Postop Opioids  Prevention: Propofol Infusion; Ondansetron; Dexamethasone     CONSENT: Direct conversation   Plan and risks discussed with: Patient   Blood Products: Consent Deferred (Minimal Blood Loss)                         Sami Mars MD  "

## 2019-06-28 ENCOUNTER — ANCILLARY PROCEDURE (OUTPATIENT)
Dept: GENERAL RADIOLOGY | Facility: CLINIC | Age: 54
End: 2019-06-28
Attending: PLASTIC SURGERY
Payer: COMMERCIAL

## 2019-06-28 ENCOUNTER — ANESTHESIA (OUTPATIENT)
Dept: SURGERY | Facility: AMBULATORY SURGERY CENTER | Age: 54
End: 2019-06-28

## 2019-06-28 ENCOUNTER — HOSPITAL ENCOUNTER (OUTPATIENT)
Facility: AMBULATORY SURGERY CENTER | Age: 54
Discharge: HOME | End: 2019-06-28
Attending: PLASTIC SURGERY | Admitting: PLASTIC SURGERY
Payer: COMMERCIAL

## 2019-06-28 VITALS
DIASTOLIC BLOOD PRESSURE: 77 MMHG | TEMPERATURE: 97 F | OXYGEN SATURATION: 99 % | RESPIRATION RATE: 16 BRPM | SYSTOLIC BLOOD PRESSURE: 115 MMHG

## 2019-06-28 DIAGNOSIS — S62.623A CLOSED DISPLACED FRACTURE OF MIDDLE PHALANX OF LEFT MIDDLE FINGER, INITIAL ENCOUNTER: Primary | ICD-10-CM

## 2019-06-28 DIAGNOSIS — S62.609A FINGER FRACTURE, LEFT: ICD-10-CM

## 2019-06-28 DIAGNOSIS — S62.623K: Primary | ICD-10-CM

## 2019-06-28 PROCEDURE — 26727 TREAT FINGER FRACTURE EACH: CPT | Mod: F1

## 2019-06-28 PROCEDURE — 26727 TREAT FINGER FRACTURE EACH: CPT | Mod: F2 | Performed by: PLASTIC SURGERY

## 2019-06-28 PROCEDURE — G8907 PT DOC NO EVENTS ON DISCHARG: HCPCS

## 2019-06-28 PROCEDURE — G8916 PT W IV AB GIVEN ON TIME: HCPCS

## 2019-06-28 RX ORDER — PHENYLEPHRINE HYDROCHLORIDE 10 MG/ML
INJECTION INTRAVENOUS PRN
Status: DISCONTINUED | OUTPATIENT
Start: 2019-06-28 | End: 2019-06-28

## 2019-06-28 RX ORDER — IBUPROFEN 600 MG/1
600 TABLET, FILM COATED ORAL
Status: DISCONTINUED | OUTPATIENT
Start: 2019-06-28 | End: 2019-06-29 | Stop reason: HOSPADM

## 2019-06-28 RX ORDER — FENTANYL CITRATE 50 UG/ML
25-50 INJECTION, SOLUTION INTRAMUSCULAR; INTRAVENOUS
Status: DISCONTINUED | OUTPATIENT
Start: 2019-06-28 | End: 2019-06-29 | Stop reason: HOSPADM

## 2019-06-28 RX ORDER — GABAPENTIN 300 MG/1
300 CAPSULE ORAL ONCE
Status: COMPLETED | OUTPATIENT
Start: 2019-06-28 | End: 2019-06-28

## 2019-06-28 RX ORDER — FENTANYL CITRATE 50 UG/ML
INJECTION, SOLUTION INTRAMUSCULAR; INTRAVENOUS PRN
Status: DISCONTINUED | OUTPATIENT
Start: 2019-06-28 | End: 2019-06-28

## 2019-06-28 RX ORDER — MEPERIDINE HYDROCHLORIDE 25 MG/ML
12.5 INJECTION INTRAMUSCULAR; INTRAVENOUS; SUBCUTANEOUS
Status: DISCONTINUED | OUTPATIENT
Start: 2019-06-28 | End: 2019-06-29 | Stop reason: HOSPADM

## 2019-06-28 RX ORDER — KETOROLAC TROMETHAMINE 30 MG/ML
30 INJECTION, SOLUTION INTRAMUSCULAR; INTRAVENOUS EVERY 6 HOURS PRN
Status: DISCONTINUED | OUTPATIENT
Start: 2019-06-28 | End: 2019-06-29 | Stop reason: HOSPADM

## 2019-06-28 RX ORDER — OXYCODONE HYDROCHLORIDE 5 MG/1
5-10 TABLET ORAL EVERY 4 HOURS PRN
Status: DISCONTINUED | OUTPATIENT
Start: 2019-06-28 | End: 2019-06-29 | Stop reason: HOSPADM

## 2019-06-28 RX ORDER — NALOXONE HYDROCHLORIDE 0.4 MG/ML
.1-.4 INJECTION, SOLUTION INTRAMUSCULAR; INTRAVENOUS; SUBCUTANEOUS
Status: DISCONTINUED | OUTPATIENT
Start: 2019-06-28 | End: 2019-06-29 | Stop reason: HOSPADM

## 2019-06-28 RX ORDER — DEXAMETHASONE SODIUM PHOSPHATE 4 MG/ML
INJECTION, SOLUTION INTRA-ARTICULAR; INTRALESIONAL; INTRAMUSCULAR; INTRAVENOUS; SOFT TISSUE PRN
Status: DISCONTINUED | OUTPATIENT
Start: 2019-06-28 | End: 2019-06-28

## 2019-06-28 RX ORDER — LIDOCAINE HYDROCHLORIDE 20 MG/ML
INJECTION, SOLUTION INFILTRATION; PERINEURAL PRN
Status: DISCONTINUED | OUTPATIENT
Start: 2019-06-28 | End: 2019-06-28

## 2019-06-28 RX ORDER — PROPOFOL 10 MG/ML
INJECTION, EMULSION INTRAVENOUS CONTINUOUS PRN
Status: DISCONTINUED | OUTPATIENT
Start: 2019-06-28 | End: 2019-06-28

## 2019-06-28 RX ORDER — CEFAZOLIN SODIUM 2 G/100ML
2 INJECTION, SOLUTION INTRAVENOUS
Status: COMPLETED | OUTPATIENT
Start: 2019-06-28 | End: 2019-06-28

## 2019-06-28 RX ORDER — CEFAZOLIN SODIUM 1 G/3ML
1 INJECTION, POWDER, FOR SOLUTION INTRAMUSCULAR; INTRAVENOUS SEE ADMIN INSTRUCTIONS
Status: DISCONTINUED | OUTPATIENT
Start: 2019-06-28 | End: 2019-06-29 | Stop reason: HOSPADM

## 2019-06-28 RX ORDER — SODIUM CHLORIDE, SODIUM LACTATE, POTASSIUM CHLORIDE, CALCIUM CHLORIDE 600; 310; 30; 20 MG/100ML; MG/100ML; MG/100ML; MG/100ML
INJECTION, SOLUTION INTRAVENOUS CONTINUOUS
Status: DISCONTINUED | OUTPATIENT
Start: 2019-06-28 | End: 2019-06-29 | Stop reason: HOSPADM

## 2019-06-28 RX ORDER — LIDOCAINE 40 MG/G
CREAM TOPICAL
Status: DISCONTINUED | OUTPATIENT
Start: 2019-06-28 | End: 2019-06-29 | Stop reason: HOSPADM

## 2019-06-28 RX ORDER — HYDROMORPHONE HYDROCHLORIDE 1 MG/ML
.3-.5 INJECTION, SOLUTION INTRAMUSCULAR; INTRAVENOUS; SUBCUTANEOUS EVERY 10 MIN PRN
Status: DISCONTINUED | OUTPATIENT
Start: 2019-06-28 | End: 2019-06-29 | Stop reason: HOSPADM

## 2019-06-28 RX ORDER — DEXAMETHASONE SODIUM PHOSPHATE 4 MG/ML
4 INJECTION, SOLUTION INTRA-ARTICULAR; INTRALESIONAL; INTRAMUSCULAR; INTRAVENOUS; SOFT TISSUE EVERY 10 MIN PRN
Status: DISCONTINUED | OUTPATIENT
Start: 2019-06-28 | End: 2019-06-29 | Stop reason: HOSPADM

## 2019-06-28 RX ORDER — PROPOFOL 10 MG/ML
INJECTION, EMULSION INTRAVENOUS PRN
Status: DISCONTINUED | OUTPATIENT
Start: 2019-06-28 | End: 2019-06-28

## 2019-06-28 RX ORDER — ONDANSETRON 2 MG/ML
INJECTION INTRAMUSCULAR; INTRAVENOUS PRN
Status: DISCONTINUED | OUTPATIENT
Start: 2019-06-28 | End: 2019-06-28

## 2019-06-28 RX ORDER — ALBUTEROL SULFATE 0.83 MG/ML
2.5 SOLUTION RESPIRATORY (INHALATION) EVERY 4 HOURS PRN
Status: DISCONTINUED | OUTPATIENT
Start: 2019-06-28 | End: 2019-06-29 | Stop reason: HOSPADM

## 2019-06-28 RX ORDER — OXYCODONE HYDROCHLORIDE 5 MG/1
5-10 TABLET ORAL EVERY 6 HOURS PRN
Qty: 10 TABLET | Refills: 0 | Status: SHIPPED | OUTPATIENT
Start: 2019-06-28 | End: 2020-01-13

## 2019-06-28 RX ORDER — OXYCODONE HYDROCHLORIDE 5 MG/1
5 TABLET ORAL
Status: COMPLETED | OUTPATIENT
Start: 2019-06-28 | End: 2019-06-28

## 2019-06-28 RX ORDER — ACETAMINOPHEN 325 MG/1
975 TABLET ORAL ONCE
Status: COMPLETED | OUTPATIENT
Start: 2019-06-28 | End: 2019-06-28

## 2019-06-28 RX ORDER — BUPIVACAINE HYDROCHLORIDE 2.5 MG/ML
INJECTION, SOLUTION INFILTRATION; PERINEURAL PRN
Status: DISCONTINUED | OUTPATIENT
Start: 2019-06-28 | End: 2019-06-28 | Stop reason: HOSPADM

## 2019-06-28 RX ORDER — ONDANSETRON 2 MG/ML
4 INJECTION INTRAMUSCULAR; INTRAVENOUS EVERY 30 MIN PRN
Status: DISCONTINUED | OUTPATIENT
Start: 2019-06-28 | End: 2019-06-29 | Stop reason: HOSPADM

## 2019-06-28 RX ORDER — ONDANSETRON 4 MG/1
4 TABLET, ORALLY DISINTEGRATING ORAL EVERY 30 MIN PRN
Status: DISCONTINUED | OUTPATIENT
Start: 2019-06-28 | End: 2019-06-29 | Stop reason: HOSPADM

## 2019-06-28 RX ADMIN — PHENYLEPHRINE HYDROCHLORIDE 100 MCG: 10 INJECTION INTRAVENOUS at 09:54

## 2019-06-28 RX ADMIN — PHENYLEPHRINE HYDROCHLORIDE 50 MCG: 10 INJECTION INTRAVENOUS at 10:04

## 2019-06-28 RX ADMIN — FENTANYL CITRATE 25 MCG: 50 INJECTION, SOLUTION INTRAMUSCULAR; INTRAVENOUS at 10:03

## 2019-06-28 RX ADMIN — LIDOCAINE HYDROCHLORIDE 60 MG: 20 INJECTION, SOLUTION INFILTRATION; PERINEURAL at 09:35

## 2019-06-28 RX ADMIN — ONDANSETRON 4 MG: 2 INJECTION INTRAMUSCULAR; INTRAVENOUS at 10:15

## 2019-06-28 RX ADMIN — DEXAMETHASONE SODIUM PHOSPHATE 10 MG: 4 INJECTION, SOLUTION INTRA-ARTICULAR; INTRALESIONAL; INTRAMUSCULAR; INTRAVENOUS; SOFT TISSUE at 09:48

## 2019-06-28 RX ADMIN — PHENYLEPHRINE HYDROCHLORIDE 50 MCG: 10 INJECTION INTRAVENOUS at 10:12

## 2019-06-28 RX ADMIN — OXYCODONE HYDROCHLORIDE 5 MG: 5 TABLET ORAL at 10:51

## 2019-06-28 RX ADMIN — FENTANYL CITRATE 25 MCG: 50 INJECTION, SOLUTION INTRAMUSCULAR; INTRAVENOUS at 09:35

## 2019-06-28 RX ADMIN — ACETAMINOPHEN 975 MG: 325 TABLET ORAL at 08:04

## 2019-06-28 RX ADMIN — PROPOFOL 200 MG: 10 INJECTION, EMULSION INTRAVENOUS at 09:35

## 2019-06-28 RX ADMIN — GABAPENTIN 300 MG: 300 CAPSULE ORAL at 08:04

## 2019-06-28 RX ADMIN — SODIUM CHLORIDE, SODIUM LACTATE, POTASSIUM CHLORIDE, CALCIUM CHLORIDE: 600; 310; 30; 20 INJECTION, SOLUTION INTRAVENOUS at 08:29

## 2019-06-28 RX ADMIN — CEFAZOLIN SODIUM 2 G: 2 INJECTION, SOLUTION INTRAVENOUS at 09:27

## 2019-06-28 RX ADMIN — PROPOFOL 150 MCG/KG/MIN: 10 INJECTION, EMULSION INTRAVENOUS at 09:37

## 2019-06-28 NOTE — DISCHARGE INSTRUCTIONS
[facility]  Same-Day Surgery   Adult Discharge Orders & Instructions   For 24 hours after surgery  1. Get plenty of rest.  A responsible adult must stay with you for at least 24 hours after you leave the hospital.   2. Do not drive or use heavy equipment.  If you have weakness or tingling, don't drive or use heavy equipment until this feeling goes away.  3. Do not drink alcohol.  4. Avoid strenuous or risky activities.  Ask for help when climbing stairs.   5. You may feel lightheaded.  IF so, sit for a few minutes before standing.  Have someone help you get up.   6. If you have nausea (feel sick to your stomach): Drink only clear liquids such as apple juice, ginger ale, broth or 7-Up.  Rest may also help.  Be sure to drink enough fluids.  Move to a regular diet as you feel able.  7. You may have a slight fever. Call the doctor if your fever is over 100 F (37.7 C) (taken under the tongue) or lasts longer than 24 hours.  8. You may have a dry mouth, a sore throat, muscle aches or trouble sleeping.  These should go away after 24 hours.  9. Do not make important or legal decisions.   Call your doctor for any of the followin.  Signs of infection (fever, growing tenderness at the surgery site, a large amount of drainage or bleeding, severe pain, foul-smelling drainage, redness, swelling).    2. It has been over 8 to 10 hours since surgery and you are still not able to urinate (pass water).    3.  Headache for over 24 hours.    4.  Numbness, tingling or weakness the day after surgery (if you had spinal anesthesia).  To contact a doctor, call ___________________________

## 2019-06-28 NOTE — ANESTHESIA POSTPROCEDURE EVALUATION
Anesthesia POST Procedure Evaluation    Patient: Rosalba Arredondo   MRN:     [id number] Gender:   female   Age:    53 year old :      1965        Preoperative Diagnosis: LEFT LONG FINGER MIDDLE PHALANX FRACTURE   Procedure(s):  CLOSED REDUCTION WITH PERCUTANEOUS PINNING OF LEFT LONG FINGER   Postop Comments: No value filed.       Anesthesia Type:  General  General    Reportable Event: NO     PAIN: Uncomplicated   Sign Out status: Comfortable, Well controlled pain     PONV: No PONV   Sign Out status:  No Nausea or Vomiting     Neuro/Psych: Uneventful perioperative course   Sign Out Status: Preoperative baseline; Age appropriate mentation     Airway/Resp.: Uneventful perioperative course   Sign Out Status: Non labored breathing, age appropriate RR; Resp. Status within EXPECTED Parameters     CV: Uneventful perioperative course   Sign Out status: Appropriate BP and perfusion indices; Appropriate HR/Rhythm     Disposition:   Sign Out in:  PACU  Disposition:  Phase II; Home  Recovery Course: Uneventful  Follow-Up: Not required           Last Anesthesia Record Vitals:  CRNA VITALS  2019 1007 - 2019 1107      2019             Pulse:  70    SpO2:  99 %          Last PACU Vitals:  Vitals Value Taken Time   /74 2019 10:37 AM   Temp 97  F (36.1  C) 2019 10:37 AM   Pulse     Resp 16 2019 10:37 AM   SpO2 98 % 2019 10:37 AM   Temp src     NIBP     Pulse 70 2019 10:37 AM   SpO2 99 % 2019 10:37 AM   Resp     Temp     Ht Rate     Temp 2           Electronically Signed By: Sami Mars MD, 2019, 2:57 PM

## 2019-06-28 NOTE — ANESTHESIA CARE TRANSFER NOTE
Patient: Rosalba Arredondo    Procedure(s):  CLOSED REDUCTION WITH PERCUTANEOUS PINNING OF LEFT LONG FINGER    Diagnosis: LEFT LONG FINGER MIDDLE PHALANX FRACTURE  Diagnosis Additional Information: No value filed.    Anesthesia Type:   General     Note:  Airway :Nasal Cannula  Patient transferred to:PACU  Comments: To PACU after LMA D/Cd.  Dentition intact/atraumatic.  Report to RN VSS Respiratory status stable.Handoff Report: Identifed the Patient, Identified the Reponsible Provider, Reviewed the pertinent medical history, Discussed the surgical course, Reviewed Intra-OP anesthesia mangement and issues during anesthesia, Set expectations for post-procedure period and Allowed opportunity for questions and acknowledgement of understanding      Vitals: (Last set prior to Anesthesia Care Transfer)    CRNA VITALS  6/28/2019 1007 - 6/28/2019 1039      6/28/2019             Pulse:  70    SpO2:  99 %                Electronically Signed By: BENOIT Pinon CRNA  June 28, 2019  10:39 AM

## 2019-07-01 ENCOUNTER — APPOINTMENT (OUTPATIENT)
Dept: NURSING | Facility: CLINIC | Age: 54
End: 2019-07-01
Payer: COMMERCIAL

## 2019-07-01 ENCOUNTER — ANCILLARY PROCEDURE (OUTPATIENT)
Dept: GENERAL RADIOLOGY | Facility: CLINIC | Age: 54
End: 2019-07-01
Attending: PLASTIC SURGERY
Payer: COMMERCIAL

## 2019-07-01 ENCOUNTER — THERAPY VISIT (OUTPATIENT)
Dept: OCCUPATIONAL THERAPY | Facility: CLINIC | Age: 54
End: 2019-07-01
Attending: PLASTIC SURGERY
Payer: COMMERCIAL

## 2019-07-01 DIAGNOSIS — M79.644 PAIN IN FINGER OF BOTH HANDS: ICD-10-CM

## 2019-07-01 DIAGNOSIS — S62.623A CLOSED DISPLACED FRACTURE OF MIDDLE PHALANX OF LEFT MIDDLE FINGER, INITIAL ENCOUNTER: ICD-10-CM

## 2019-07-01 DIAGNOSIS — M79.645 PAIN IN FINGER OF BOTH HANDS: ICD-10-CM

## 2019-07-01 PROCEDURE — 97110 THERAPEUTIC EXERCISES: CPT | Mod: GO | Performed by: OCCUPATIONAL THERAPIST

## 2019-07-01 PROCEDURE — 97165 OT EVAL LOW COMPLEX 30 MIN: CPT | Mod: GO | Performed by: OCCUPATIONAL THERAPIST

## 2019-07-01 PROCEDURE — 73140 X-RAY EXAM OF FINGER(S): CPT | Mod: LT | Performed by: RADIOLOGY

## 2019-07-01 PROCEDURE — 97760 ORTHOTIC MGMT&TRAING 1ST ENC: CPT | Mod: GO | Performed by: OCCUPATIONAL THERAPIST

## 2019-07-01 NOTE — PROGRESS NOTES
Hand Therapy Initial Evaluation    Current Date:  7/1/2019  Referring Physician: Dr. Jones    Diagnosis: Left long finger middle phalanx closed neck fracture, displaced  DOI: 6/8/19  DOS: 6/28/19  Procedure:  1.  Closed reduction and percutaneous pinning of left long finger middle phalangeal fracture.  2.  Application of left hand intrinsic plus short arm splint  Post:  0w 3d    Subjective:  Rosalba Arredondo is a 53 year old right hand dominant female.    Patient reports symptoms of pain, stiffness/loss of motion, weakness/loss of strength, edema, numbness and tingling  of the right index finger and left middle finger which occurred due to an injury sustained when her fingers got caught in the hinges of a ladder.. Since onset symptoms are Gradually getting better.  Special tests:  x-ray.  Previous treatment: surgery, splints.    General health as reported by patient is good.  Pertinent medical history includes:Asthma, Menopausal, Overweight, Calf Pain, Swelling, Warmth  Medical allergies:none.  Surgical history: orthopedic: recent hand.  Medication history: please refer to medical chart.    Occupational Profile Information:  Current occupation is   Currently working in normal job without restrictions  Job Tasks: Computer Work, Prolonged Sitting  Prior functional level:  no limitations  Barriers include:none  Mobility: No difficulty  Transportation: drives  Leisure activities/hobbies: bar weight classes, gardening    Upper Extremity Functional Index Score:  SCORE:   Column Totals: /80: 41   (A lower score indicates greater disability.)    Objective:  Pain Level (Scale 0-10):   7/1/2019   At Rest 2/10   With Use 4/10     Pain Description:  Date 7/1/2019   Location (R) index finger and (L) long finger   Pain Quality Throbbing and Tingling   Frequency intermittent     Pain is worst  daytime   Exacerbated by  movement   Relieved by cold, NSAIDs and rest   Progression Gradually improving      ROM:  ROM  Hand  7/1/2019 7/1/2019   AROM(PROM) Right Left   Index MP /80 /   PIP /80 /   DIP /30 /   HERNADEZ     Long MP / /85   PIP / /35   DIP / pinned   HERNADEZ     Ring MP / /   PIP / /   DIP / /   HERNADEZ     Small MP / /   PIP / /   DIP / /   HERNADEZ       Strength:  [x]                    Contraindicated    Edema:  []         None   [x]         Mild    []         Moderate      []         Severe                  Location: (R) IF and (L) MF   Edema - Measured circumferentially in cm  Date 7/1/19        Index Right Left Right Left Right Left Right Left Right Left Right Left                   P1               PIP 5.3 4.6              P2 5.7 5.5             DIP                   Color/Temperature:     []        Normal  [x]        Abnormal   Bruising present on the dorsal MCPof the (L) MF    Wound(s):  []          NA       []        Open    []        Closed    [x]        Sutured        []        Drainage  Pins in place on  (L) MF middle phalanx    Scar:  [x]        NA           []      Adherent      []       Non-adherent       []       Raised     []       Flattened              Palpation:  []         Normal        [x]       Tender       []      Mild         [x]       Moderate []       Severe     Location: (L) MF pin site area    Sensation: []                   WNL throughout all nerve distributions; per patient report    [x]                   Decreased  []        Median    []        Ulnar    []        Radial nerve distribution   Patient reports numbness and tingling in the (R) IF and (L) MF tips    Assessment:  Patient presents with symptoms consistent with diagnosis of finger fractures,  with surgical  intervention.     Patient's limitations or Problem List includes:  Pain, Decreased ROM/motion, Increased edema, Weakness, Sensory disturbance, Decreased stability, Decreased  and Decreased pinch of the right index finger and left long finger which interferes with the patient's ability to perform Self Care Tasks (dressing, eating,  hygiene/toileting), Work Tasks, Recreational Activities, Household Chores and Driving  as compared to previous level of function.    Rehab Potential:  Excellent - Return to full activity, no limitations    Patient will benefit from skilled Occupational Therapy to increase ROM, flexibility, overall strength,  strength, pinch strength, coordination and dexterity and decrease pain and edema to return to previous activity level and resume normal daily tasks and to reach their rehab potential.    Barriers to Learning:  No barrier    Communication Issues:  Patient appears to be able to clearly communicate and understand verbal and written communication and follow directions correctly.    Chart Review: Brief history including review of medical and/or therapy records relating to the presenting problem and Simple history review with patient    Identified Performance Deficits: bathing/showering, dressing, hygiene and grooming, home establishment and management, meal preparation and cleanup, work and leisure activities    Assessment of Occupational Performance:  5 or more Performance Deficits    Clinical Decision Making (Complexity): Low complexity    Treatment Explanation:  The following has been discussed with the patient:  RX ordered/plan of care  Anticipated outcomes  Possible risks and side effects    Frequency:  2 X a month, once daily  Duration:  for 1-2 months icreasing to 1 X a week over 6  weeks  Treatment Plan:  Modalities:  Fluidotherapy and Paraffin  Therapeutic Exercise:  AROM, AAROM, PROM, Tendon Gliding, Blocking, Reverse Blocking, Place and Hold, Isotonics and Isometrics  Manual Techniques:  Joint mobilization, Scar mobilization and Manual edema mobilization  Orthotic Fabrication:  Static orthosis, Finger based orthosis and Hand based orthosis  Discharge Plan:  Achieve all LTG.  Independent in home treatment program.  Reach maximal therapeutic benefit.      Home Exercise Program:  Tendon Glides  Blocking  of PIP and DIP  Compliance with full time wear of orthosis,  Remove for hygiene and exercises    Next Visit: 2 weeks  Check orthosis and adjust as indicated  Check ROM and advance per healing

## 2019-07-11 ENCOUNTER — THERAPY VISIT (OUTPATIENT)
Dept: OCCUPATIONAL THERAPY | Facility: CLINIC | Age: 54
End: 2019-07-11
Payer: COMMERCIAL

## 2019-07-11 DIAGNOSIS — M79.645 PAIN IN FINGER OF BOTH HANDS: ICD-10-CM

## 2019-07-11 DIAGNOSIS — M79.644 PAIN IN FINGER OF BOTH HANDS: ICD-10-CM

## 2019-07-11 PROCEDURE — 97110 THERAPEUTIC EXERCISES: CPT | Mod: GO | Performed by: OCCUPATIONAL THERAPIST

## 2019-07-11 PROCEDURE — 97140 MANUAL THERAPY 1/> REGIONS: CPT | Mod: GO | Performed by: OCCUPATIONAL THERAPIST

## 2019-07-11 NOTE — PROGRESS NOTES
SOAP Note - Hand Therapy - Objective Information    Current Date:  7/11/2019  Referring Physician: Dr. Jones    Diagnosis: Left long finger middle phalanx closed neck fracture, displaced  DOI: 6/8/19  DOS: 6/28/19  Procedure:  1.  Closed reduction and percutaneous pinning of left long finger middle phalangeal fracture.  2.  Application of left hand intrinsic plus short arm splint  Post:  0w 3d    Rosalba Arredondo is a 53 year old right hand dominant female.    Patient reports symptoms of pain, stiffness/loss of motion, weakness/loss of strength, edema, numbness and tingling  of the right index finger and left middle finger which occurred due to an injury sustained when her fingers got caught in the hinges of a ladder.    Occupational Profile Information:  Current occupation is     S:  Subjective changes as noted by patient: The fingers are doing OK  Functional changes noted by patient: No changes      O:  Pain Level (Scale 0-10):   7/1/2019 7/11/19   At Rest 2/10 2/10   With Use 4/10 4/10     Pain Description:  Date 7/1/2019   Location (R) index finger and (L) long finger   Pain Quality Throbbing and Tingling   Frequency intermittent     Pain is worst  daytime   Exacerbated by  movement   Relieved by cold, NSAIDs and rest   Progression Gradually improving      ROM:    Hand 7/1/2019 7/11/19 7/1/2019 7/11/19   AROM(PROM) Right Right Left Left   Index MP /80 /80 /    PIP /80 /98 /    DIP /30 /50 /    HERNADEZ       Long MP /  /85 /85   PIP /  /35 +22/45   DIP /  pinned    HERNADEZ       Ring MP /  /    PIP /  /    DIP /  /    HERNADEZ       Small MP /  /    PIP /  /    DIP /  /    HERNADEZ         Strength:  [x]                    Contraindicated    Edema:  []         None   [x]         Mild    []         Moderate      []         Severe                  Location: (R) IF and (L) MF   Edema - Measured circumferentially in cm  Date 7/1/19 7/11/19       Index Right Left Right Left Right Left Right Left Right Left Right Left                    P1               PIP 5.7 4.6 5.7             P2 5.3 5.5 5.2            DIP                 Edema - Measured circumferentially in cm  Date 7/11/19        Middle Right Left Right Left Right Left Right Left Right Left Right Left                   P1               PIP 5.9 6.7              P2 5.2 6.1             DIP                 Color/Temperature:     []        Normal  [x]        Abnormal   Bruising present on the dorsal MCPof the (L) MF    Wound(s):  []          NA       []        Open    []        Closed    [x]        Sutured        []        Drainage  Pins in place on  (L) MF middle phalanx    Scar:  [x]        NA           []      Adherent      []       Non-adherent       []       Raised     []       Flattened              Palpation:  []         Normal        [x]       Tender       []      Mild         [x]       Moderate []       Severe     Location: (L) MF pin site area    Sensation: []                   WNL throughout all nerve distributions; per patient report    [x]                   Decreased  []        Median    []        Ulnar    []        Radial nerve distribution   Patient reports numbness and tingling in the (R) IF and (L) MF tips      Please refer to the daily flowsheet for treatment provided today.   Home Exercise Program:  Tendon Glides  Blocking of PIP and DIP  Compliance with full time wear of orthosis,  Remove for hygiene and exercises  Coban to reduce swelling    Next Visit: 2 weeks  Check orthosis and adjust as indicated  Check ROM and advance per healing

## 2019-07-12 ENCOUNTER — OFFICE VISIT (OUTPATIENT)
Dept: ORTHOPEDICS | Facility: CLINIC | Age: 54
End: 2019-07-12
Payer: COMMERCIAL

## 2019-07-12 ENCOUNTER — ANCILLARY PROCEDURE (OUTPATIENT)
Dept: GENERAL RADIOLOGY | Facility: CLINIC | Age: 54
End: 2019-07-12
Attending: PLASTIC SURGERY
Payer: COMMERCIAL

## 2019-07-12 VITALS — DIASTOLIC BLOOD PRESSURE: 64 MMHG | HEART RATE: 61 BPM | SYSTOLIC BLOOD PRESSURE: 96 MMHG

## 2019-07-12 DIAGNOSIS — S62.623A CLOSED DISPLACED FRACTURE OF MIDDLE PHALANX OF LEFT MIDDLE FINGER, INITIAL ENCOUNTER: ICD-10-CM

## 2019-07-12 DIAGNOSIS — S62.623A CLOSED DISPLACED FRACTURE OF MIDDLE PHALANX OF LEFT MIDDLE FINGER, INITIAL ENCOUNTER: Primary | ICD-10-CM

## 2019-07-12 PROCEDURE — 99024 POSTOP FOLLOW-UP VISIT: CPT | Performed by: PLASTIC SURGERY

## 2019-07-12 PROCEDURE — 73140 X-RAY EXAM OF FINGER(S): CPT | Mod: LT | Performed by: RADIOLOGY

## 2019-07-12 ASSESSMENT — PAIN SCALES - GENERAL: PAINLEVEL: NO PAIN (1)

## 2019-07-12 NOTE — NURSING NOTE
Rosalba Arredondo's chief complaint for this visit includes:  Chief Complaint   Patient presents with     Left Middle Finger - Surgical Followup     2 wks S/p closed reduction percutaneous pinning of left long finger, sx: 6/28/19     PCP: No Ref-Primary, Physician    Referring Provider:  No referring provider defined for this encounter.    BP 96/64   Pulse 61   No Pain (1)     Do you need any medication refills at today's visit? n/a

## 2019-07-12 NOTE — PROGRESS NOTES
Patient returns for a postoperative follow-up after undergoing left long finger middle phalangeal fracture percutaneous pinning.    INTERVAL HISTORY: Pain is well controlled.  She is performing PIP range of motion exercises.  She is also performing edema control with hand therapy.  Denies numbness or tingling.    PHYSICAL EXAMINATION:  BP 96/64   Pulse 61   General: No acute distress.    On examination of the left long finger, pin sites are clean.  Pins are stable appearing.  There is mild swelling around the DIP joint.  PIP joint can be ranged passively.  Sensation to light touch is intact along the radial and ulnar borders of left long finger.    IMAGING: X-ray imaging of the left long finger shows stable intact hardware with good reduction of middle phalanx fracture.    ASSESSMENT: 2 weeks status post closed reduction percutaneous pinning of left long finger middle phalanx fracture.    PLAN: Patient is to remain with the K wires for another 4 weeks.  She is to continue with hand therapy.  She can either have the pins removed without a digital block in clinic, with a digital block in clinic, or with sedation in the procedure room.  She would like to consider her options and get back to us.    Total time spent with patient was 15 min of which greater than 50% was in counseling.

## 2019-07-12 NOTE — LETTER
7/12/2019         RE: Rosalba Arredondo  [address] [place] [address]        Dear Colleague,    Thank you for referring your patient, Rosalba Arredondo, to the [facility]. Please see a copy of my visit note below.    Patient returns for a postoperative follow-up after undergoing left long finger middle phalangeal fracture percutaneous pinning.    INTERVAL HISTORY: Pain is well controlled.  She is performing PIP range of motion exercises.  She is also performing edema control with hand therapy.  Denies numbness or tingling.    PHYSICAL EXAMINATION:  BP 96/64   Pulse 61   General: No acute distress.    On examination of the left long finger, pin sites are clean.  Pins are stable appearing.  There is mild swelling around the DIP joint.  PIP joint can be ranged passively.  Sensation to light touch is intact along the radial and ulnar borders of left long finger.    IMAGING: X-ray imaging of the left long finger shows stable intact hardware with good reduction of middle phalanx fracture.    ASSESSMENT: 2 weeks status post closed reduction percutaneous pinning of left long finger middle phalanx fracture.    PLAN: Patient is to remain with the K wires for another 4 weeks.  She is to continue with hand therapy.  She can either have the pins removed without a digital block in clinic, with a digital block in clinic, or with sedation in the procedure room.  She would like to consider her options and get back to us.    Total time spent with patient was 15 min of which greater than 50% was in counseling.    Again, thank you for allowing me to participate in the care of your patient.        Sincerely,        Rafael Jones MD

## 2019-07-12 NOTE — PATIENT INSTRUCTIONS
Pin Removal Plans:    - Keep clinic appointment on 19 at the [facility]: pull pins out with or without the nerve block (numbing the finger).    - Schedule procedure to be done on 19 at [facility] to have pins removed under sedation. You will need a  for this option.    Call with any questions: KADIE Fiore ([phone])    --------------------------------------------------    Thanks for coming today.  Ortho[facility] Medicine Clinic  [address] [place] [address]    To schedule future appointments in Ortho Clinic, you may call [phone].    To schedule ordered imaging by your provider:   Call [facility] [phone]    To schedule an injection ordered by your provider:  Call [facility] Injection scheduling line: [phone]  [employer] available online at:  [email or web address]    Please call if any further questions or concerns ([phone]).  Clinic hours 8 am to 5 pm.    Return to clinic (call) if symptoms worsen or fail to improve.

## 2019-07-15 ENCOUNTER — TELEPHONE (OUTPATIENT)
Dept: ORTHOPEDICS | Facility: CLINIC | Age: 54
End: 2019-07-15

## 2019-07-15 NOTE — TELEPHONE ENCOUNTER
Discussed with Pt. Updated appt note. Sent a message to Dr. Jones as ANTONI.    Adebayo Fernandez RN

## 2019-07-15 NOTE — TELEPHONE ENCOUNTER
[facility] Call Center    Phone Message    May a detailed message be left on voicemail: yes    Reason for Call: Surgery information    Patient stated she was asked to choose between a few different options for her hand surgery - she wants to relay that she would like the surgery with a nerve block.  Please contact patient to confirm and if any other questions.    Action Taken: Message routed to:  Adult Clinics: Orthopedics p [phone]

## 2019-07-16 ENCOUNTER — THERAPY VISIT (OUTPATIENT)
Dept: OCCUPATIONAL THERAPY | Facility: CLINIC | Age: 54
End: 2019-07-16
Payer: COMMERCIAL

## 2019-07-16 DIAGNOSIS — S62.623K: ICD-10-CM

## 2019-07-16 DIAGNOSIS — S62.660K: Primary | ICD-10-CM

## 2019-07-16 DIAGNOSIS — M79.645 PAIN IN FINGER OF BOTH HANDS: ICD-10-CM

## 2019-07-16 DIAGNOSIS — M79.644 PAIN IN FINGER OF BOTH HANDS: ICD-10-CM

## 2019-07-16 PROCEDURE — 97140 MANUAL THERAPY 1/> REGIONS: CPT | Mod: GO | Performed by: OCCUPATIONAL THERAPIST

## 2019-07-16 PROCEDURE — 97110 THERAPEUTIC EXERCISES: CPT | Mod: GO | Performed by: OCCUPATIONAL THERAPIST

## 2019-07-16 NOTE — PROGRESS NOTES
SOAP Note - Hand Therapy - Objective Information    Current Date:  7/16/2019  Referring Physician: Dr. Jones    Diagnosis: Left long finger middle phalanx closed neck fracture, displaced  DOI: 6/8/19  DOS: 6/28/19  Procedure:  1.  Closed reduction and percutaneous pinning of left long finger middle phalangeal fracture.  2.  Application of left hand intrinsic plus short arm splint  Post:  2w 4d    Rosalba Arreodndo is a 53 year old right hand dominant female.    Patient reports symptoms of pain, stiffness/loss of motion, weakness/loss of strength, edema, numbness and tingling  of the right index finger and left middle finger which occurred due to an injury sustained when her fingers got caught in the hinges of a ladder.    Occupational Profile Information:  Current occupation is     S:  Subjective changes as noted by patient: The fingers are getting better.    Functional changes noted by patient: No changes      O:  Pain Level (Scale 0-10):   7/1/2019 7/11/19 7/16/19   At Rest 2/10 2/10 2/10   With Use 4/10 4/10 4/10     Pain Description:  Date 7/1/2019   Location (R) index finger and (L) long finger   Pain Quality Throbbing and Tingling   Frequency intermittent     Pain is worst  daytime   Exacerbated by  movement   Relieved by cold, NSAIDs and rest   Progression Gradually improving      ROM:    Hand 7/1/2019 7/11/19 7/1/2019 7/11/19 7/16/19   AROM(PROM) Right Right Left Left    Index MP /80 /80 /     PIP /80 /98 /     DIP /30 /50 /     HERNADEZ        Long MP /  /85 /85    PIP /  /35 +22/45 /60   DIP /  pinned     HERNADEZ        Ring MP /  /     PIP /  /     DIP /  /     HERNADEZ        Small MP /  /     PIP /  /     DIP /  /     HERNADEZ          Strength:  [x]                    Contraindicated    Edema:  []         None   [x]         Mild    []         Moderate      []         Severe                  Location: (R) IF and (L) MF   Edema - Measured circumferentially in cm  Date 7/1/19 7/11/19       Index Right Left Right  Left Right Left Right Left Right Left Right Left                   P1               PIP 5.7 4.6 5.7             P2 5.3 5.5 5.2            DIP                 Edema - Measured circumferentially in cm  Date 7/11/19 7/16/19       Middle Right Left Right Left Right Left Right Left Right Left Right Left                   P1               PIP 5.9 6.7  6.4            P2 5.2 6.1  6.0           DIP                 Color/Temperature:     [x]        Normal  []        Abnormal    Wound(s):  []          NA       []        Open    []        Closed    [x]        Sutured        []        Drainage  Pins in place on  (L) MF middle phalanx            Palpation:  []         Normal        [x]       Tender       []      Mild         [x]       Moderate []       Severe     Location: (L) MF pin site area    Sensation: []                   WNL throughout all nerve distributions; per patient report    [x]                   Decreased  []        Median    []        Ulnar    []        Radial nerve distribution   Patient reports numbness and tingling in the (R) IF and (L) MF tips      Please refer to the daily flowsheet for treatment provided today.   Home Exercise Program:  Tendon Glides  Blocking of PIP and DIP  Compliance with full time wear of orthosis,  Remove for hygiene and exercises  Coban to reduce swelling    Next Visit: Aug 8 (s/p pins removal)  Check orthosis and adjust as indicated  Check ROM and advance per healing

## 2019-08-05 DIAGNOSIS — S62.623A CLOSED DISPLACED FRACTURE OF MIDDLE PHALANX OF LEFT MIDDLE FINGER, INITIAL ENCOUNTER: Primary | ICD-10-CM

## 2019-08-06 ENCOUNTER — OFFICE VISIT (OUTPATIENT)
Dept: ORTHOPEDICS | Facility: CLINIC | Age: 54
End: 2019-08-06
Payer: COMMERCIAL

## 2019-08-06 ENCOUNTER — ANCILLARY PROCEDURE (OUTPATIENT)
Dept: GENERAL RADIOLOGY | Facility: CLINIC | Age: 54
End: 2019-08-06
Attending: PLASTIC SURGERY
Payer: COMMERCIAL

## 2019-08-06 DIAGNOSIS — S62.623A CLOSED DISPLACED FRACTURE OF MIDDLE PHALANX OF LEFT MIDDLE FINGER, INITIAL ENCOUNTER: ICD-10-CM

## 2019-08-06 DIAGNOSIS — S62.623K: Primary | ICD-10-CM

## 2019-08-06 ASSESSMENT — PAIN SCALES - GENERAL: PAINLEVEL: NO PAIN (0)

## 2019-08-06 NOTE — PROGRESS NOTES
Patient returns for a postoperative follow-up after undergoing closed reduction percutaneous pinning of left long finger middle phalanx neck fracture.    INTERVAL HISTORY: Pain is controlled.  Patient is excited to have her pins removed.  Denies any numbness or tingling.  Is concerned that her right index finger is not healing appropriately.    PHYSICAL EXAMINATION:  On examination of the left hand, long finger middle phalanx is only minimally tender to palpation at the site of fracture.  Fracture site feels stable.  Pins are intact with no sign of pin site infection.  Sensation to light touch is intact in both radial and ulnar borders of the fingertip.  On examination of the right index finger, there is minimal tenderness on palpation of the distal phalanx.  Nail growth is normal.  Flexion and extension is intact at the DIP joint.    IMAGING: X-ray imaging of the left long finger shows callus formation around fracture site and intact pins.    ASSESSMENT: 6 weeks status post closed reduction per cutaneous pinning of left long finger middle phalanx neck fracture.  Patient has achieved clinical union.    PLAN: Consent was obtained for a left long finger digital block.  This was then performed and the pins were removed.  Patient is to follow-up with hand therapy to begin moving left long finger.  She may return to see me as needed.  I assured the patient that her right index finger will continue to improve.    Total time spent with patient was 15 min of which greater than 50% was in counseling.

## 2019-08-06 NOTE — LETTER
8/6/2019       RE: Rosalba Arredondo  [address] [place] [address]     Dear Colleague,    Thank you for referring your patient, Rosalba Arredondo, to the [facility]. Please see a copy of my visit note below.    Reason For Visit:   Chief Complaint   Patient presents with     Left Middle Finger - RECHECK, Surgical Followup     Surgical Followup     left middle finger pain. pins coming out today       There were no vitals taken for this visit.    Luis Oliver CMA      Patient returns for a postoperative follow-up after undergoing closed reduction percutaneous pinning of left long finger middle phalanx neck fracture.    INTERVAL HISTORY: Pain is controlled.  Patient is excited to have her pins removed.  Denies any numbness or tingling.  Is concerned that her right index finger is not healing appropriately.    PHYSICAL EXAMINATION:  On examination of the left hand, long finger middle phalanx is only minimally tender to palpation at the site of fracture.  Fracture site feels stable.  Pins are intact with no sign of pin site infection.  Sensation to light touch is intact in both radial and ulnar borders of the fingertip.  On examination of the right index finger, there is minimal tenderness on palpation of the distal phalanx.  Nail growth is normal.  Flexion and extension is intact at the DIP joint.    IMAGING: X-ray imaging of the left long finger shows callus formation around fracture site and intact pins.    ASSESSMENT: 6 weeks status post closed reduction per cutaneous pinning of left long finger middle phalanx neck fracture.  Patient has achieved clinical union.    PLAN: Consent was obtained for a left long finger digital block.  This was then performed and the pins were removed.  Patient is to follow-up with hand therapy to begin moving left long finger.  She may return to see me as needed.  I assured the patient that her right index finger  will continue to improve.    Total time spent with patient was 15 min of which greater than 50% was in counseling.    Again, thank you for allowing me to participate in the care of your patient.      Sincerely,    Rafael Jones MD

## 2019-08-06 NOTE — PROGRESS NOTES
Reason For Visit:   Chief Complaint   Patient presents with     Left Middle Finger - RECHECK, Surgical Followup     Surgical Followup     left middle finger pain. pins coming out today       There were no vitals taken for this visit.         Luis Oliver CMA

## 2019-08-08 ENCOUNTER — THERAPY VISIT (OUTPATIENT)
Dept: OCCUPATIONAL THERAPY | Facility: CLINIC | Age: 54
End: 2019-08-08
Payer: COMMERCIAL

## 2019-08-08 DIAGNOSIS — S62.623K: Primary | ICD-10-CM

## 2019-08-08 DIAGNOSIS — M79.645 PAIN IN FINGER OF BOTH HANDS: ICD-10-CM

## 2019-08-08 DIAGNOSIS — M79.644 PAIN IN FINGER OF BOTH HANDS: ICD-10-CM

## 2019-08-08 PROCEDURE — 97110 THERAPEUTIC EXERCISES: CPT | Mod: GO | Performed by: OCCUPATIONAL THERAPIST

## 2019-08-08 PROCEDURE — 97018 PARAFFIN BATH THERAPY: CPT | Mod: GO | Performed by: OCCUPATIONAL THERAPIST

## 2019-08-08 NOTE — PROGRESS NOTES
SOAP Note - Hand Therapy - Objective Information    Current Date:  8/8/2019  Referring Physician: Dr. Jones    Diagnosis: Left long finger middle phalanx closed neck fracture, displaced  DOI: 6/8/19  DOS: 6/28/19  Procedure:  1.  Closed reduction and percutaneous pinning of left long finger middle phalangeal fracture.  2.  Application of left hand intrinsic plus short arm splint  Post:  5w 5d    Rosalba Arredondo is a 53 year old right hand dominant female.    Patient reports symptoms of pain, stiffness/loss of motion, weakness/loss of strength, edema, numbness and tingling  of the right index finger and left middle finger which occurred due to an injury sustained when her fingers got caught in the hinges of a ladder.    Occupational Profile Information:  Current occupation is     S:  Subjective changes as noted by patient: The pins are out but the finger is really stiff.  Functional changes noted by patient: No changes      O:  Pain Level (Scale 0-10):   7/1/2019 7/11/19 7/16/19 8/8/19   At Rest 2/10 2/10 2/10 0/10   With Use 4/10 4/10 4/10 1/10     Pain Description:  Date 7/1/2019   Location (R) index finger and (L) long finger   Pain Quality Throbbing and Tingling   Frequency intermittent     Pain is worst  daytime   Exacerbated by  movement   Relieved by cold, NSAIDs and rest   Progression Gradually improving      ROM:    Hand 7/1/2019 7/11/19 7/1/2019 7/11/19 7/16/19 8/8/19   AROM(PROM) Right Right Left Left Left Left   Index MP /80 /80 /      PIP /80 /98 /      DIP /30 /50 /      HERNADEZ         Long MP /  /85 /85  /85   PIP /  /35 +22/45 /60 /50   DIP /  pinned   -15/25   HERNADEZ         Ring MP /  /      PIP /  /      DIP /  /      HERNADEZ         Small MP /  /      PIP /  /      DIP /  /      HERNADEZ           Strength:  [x]                    Contraindicated    Edema:  []         None   [x]         Mild    []         Moderate      []         Severe                  Location: (R) IF and (L) MF   Edema - Measured  circumferentially in cm  Date 7/1/19 7/11/19       Index Right Left Right Left Right Left Right Left Right Left Right Left                   P1               PIP 5.7 4.6 5.7             P2 5.3 5.5 5.2            DIP                 Edema - Measured circumferentially in cm  Date 7/11/19 7/16/19 8/8/19      Middle Right Left Right Left Right Left Right Left Right Left Right Left                   P1               PIP 5.9 6.7  6.4            P2 5.2 6.1  6.0  5.4         DIP     4.7 5.2             Palpation:  []         Normal        [x]       Tender       [x]      Mild         []       Moderate []       Severe     Location: (L) MF DIP    Sensation: []                   WNL throughout all nerve distributions; per patient report    [x]                   Decreased  []        Median    []        Ulnar    []        Radial nerve distribution   Patient reports numbness and tingling in the (R) IF and (L) MF tips      Please refer to the daily flowsheet for treatment provided today.   Home Exercise Program:  Tendon Glides  Blocking of PIP and DIP  Coban to reduce swelling  Gentle PROM of (L) MF PIP an DIP    Next Visit:   Paraffin  A/PROM of (L) MF all joints

## 2019-08-15 ENCOUNTER — THERAPY VISIT (OUTPATIENT)
Dept: OCCUPATIONAL THERAPY | Facility: CLINIC | Age: 54
End: 2019-08-15
Payer: COMMERCIAL

## 2019-08-15 DIAGNOSIS — M79.645 PAIN IN FINGER OF BOTH HANDS: Primary | ICD-10-CM

## 2019-08-15 DIAGNOSIS — M79.644 PAIN IN FINGER OF BOTH HANDS: Primary | ICD-10-CM

## 2019-08-15 PROCEDURE — 97110 THERAPEUTIC EXERCISES: CPT | Mod: GO | Performed by: OCCUPATIONAL THERAPIST

## 2019-08-15 PROCEDURE — 97018 PARAFFIN BATH THERAPY: CPT | Mod: GO | Performed by: OCCUPATIONAL THERAPIST

## 2019-08-15 NOTE — PROGRESS NOTES
SOAP note objective information for 8/15/2019.  Please refer to the daily flowsheet for treatment today, total treatment time and time spent performing 1:1 timed codes.     Diagnosis: Left long finger middle phalanx closed neck fracture, displaced  DOI: 6/8/19  DOS: 6/28/19  Procedure:  1.  Closed reduction and percutaneous pinning of left long finger middle phalangeal fracture.  2.  Application of left hand intrinsic plus short arm splint  Post:  6w 6d    Rosalba Arredondo is a 53 year old right hand dominant female.    Patient reports symptoms of pain, stiffness/loss of motion, weakness/loss of strength, edema, numbness and tingling  of the right index finger and left middle finger which occurred due to an injury sustained when her fingers got caught in the hinges of a ladder.    Occupational Profile Information:  Current occupation is     S:  Subjective changes as noted by patient: I feel like my motion is improving.  Functional changes noted by patient: Still having trouble with gripping to open a jar. Typing is improving.      O:  Pain Level (Scale 0-10):   7/1/2019 7/11/19 7/16/19 8/8/19 8/15/2019   At Rest 2/10 2/10 2/10 0/10 0/10   With Use 4/10 4/10 4/10 1/10 1/10     Pain Description:  Date 7/1/2019   Location (R) index finger and (L) long finger   Pain Quality Throbbing and Tingling   Frequency intermittent     Pain is worst  daytime   Exacerbated by  movement   Relieved by cold, NSAIDs and rest   Progression Gradually improving      ROM:    Hand 7/1/2019 7/11/19 7/1/2019 7/11/19 7/16/19 8/8/19 8/15/2019     AROM(PROM) Right Right Left Left Left Left Left   Index MP /80 /80 /       PIP /80 /98 /       DIP /30 /50 /       HERNADEZ          Long MP /  /85 /85  /85 /90   PIP /  /35 +22/45 /60 /50 /75   DIP /  pinned   -15/25 -13/45   HERNADEZ          Ring MP /  /       PIP /  /       DIP /  /       HERNADEZ          Small MP /  /       PIP /  /       DIP /  /       HERNADEZ            Strength:  [x]                     Contraindicated    Edema:  []         None   [x]         Mild    []         Moderate      []         Severe                  Location: (R) IF and (L) MF   Edema - Measured circumferentially in cm  Date 7/1/19 7/11/19       Index Right Left Right Left Right Left Right Left Right Left Right Left                   P1               PIP 5.7 4.6 5.7             P2 5.3 5.5 5.2            DIP                 Edema - Measured circumferentially in cm  Date 7/11/19 7/16/19 8/8/19      Middle Right Left Right Left Right Left Right Left Right Left Right Left                   P1               PIP 5.9 6.7  6.4            P2 5.2 6.1  6.0  5.4         DIP     4.7 5.2             Palpation:  []         Normal        [x]       Tender       [x]      Mild         []       Moderate []       Severe     Location: (L) MF DIP    Sensation: []                   WNL throughout all nerve distributions; per patient report    [x]                   Decreased  []        Median    []        Ulnar    []        Radial nerve distribution   Patient reports numbness and tingling in the (R) IF and (L) MF tips      Home Exercise Program:  Tendon Glides  Blocking of PIP and DIP  Coban to reduce swelling  Gentle PROM of (L) MF PIP an DIP  8/15/19  Gentle  with sponge    Next Visit:   Paraffin  A/PROM of (L) MF all joints

## 2019-08-22 ENCOUNTER — THERAPY VISIT (OUTPATIENT)
Dept: OCCUPATIONAL THERAPY | Facility: CLINIC | Age: 54
End: 2019-08-22
Payer: COMMERCIAL

## 2019-08-22 DIAGNOSIS — M79.645 PAIN IN FINGER OF BOTH HANDS: ICD-10-CM

## 2019-08-22 DIAGNOSIS — M79.644 PAIN IN FINGER OF BOTH HANDS: ICD-10-CM

## 2019-08-22 PROCEDURE — 97110 THERAPEUTIC EXERCISES: CPT | Mod: GO | Performed by: OCCUPATIONAL THERAPIST

## 2019-08-22 NOTE — PROGRESS NOTES
SOAP note objective information for 822/2019.  Please refer to the daily flowsheet for treatment today, total treatment time and time spent performing 1:1 timed codes.     Diagnosis: Left long finger middle phalanx closed neck fracture, displaced  DOI: 6/8/19  DOS: 6/28/19  Procedure:  1.  Closed reduction and percutaneous pinning of left long finger middle phalangeal fracture.  2.  Application of left hand intrinsic plus short arm splint  Post:  6w 6d    Rosalba Arredondo is a 53 year old right hand dominant female.    Patient reports symptoms of pain, stiffness/loss of motion, weakness/loss of strength, edema, numbness and tingling  of the right index finger and left middle finger which occurred due to an injury sustained when her fingers got caught in the hinges of a ladder.    Occupational Profile Information:  Current occupation is     S:  Subjective changes as noted by patient:I had a good session last week.  Functional changes noted by patient: no changes      O:  Pain Level (Scale 0-10):   7/1/2019 7/11/19 7/16/19 8/8/19 8/15/2019   At Rest 2/10 2/10 2/10 0/10 0/10   With Use 4/10 4/10 4/10 1/10 1/10     Pain Description:  Date 7/1/2019   Location (R) index finger and (L) long finger   Pain Quality Throbbing and Tingling   Frequency intermittent     Pain is worst  daytime   Exacerbated by  movement   Relieved by cold, NSAIDs and rest   Progression Gradually improving      ROM:    Hand 7/1/2019 7/11/19 7/1/2019 7/11/19 7/16/19 8/8/19 8/15/2019     AROM(PROM) Right Right Left Left Left Left Left   Index MP /80 /80 /       PIP /80 /98 /       DIP /30 /50 /       HERNADEZ          Long MP /  /85 /85  /85 /90   PIP /  /35 +22/45 /60 /50 /75   DIP /  pinned   -15/25 -13/45   HERNADEZ          Ring MP /  /       PIP /  /       DIP /  /       HERNADEZ          Small MP /  /       PIP /  /       DIP /  /       HERNADEZ            Strength   Painfree (Measured in pounds)  Pain Report:  + mild    ++ moderate    +++ severe     8/22/2019 8/22/2019   Trials Right Left   1  2  3 72  66  67 51  44  44   Average 68 44     Lat Pinch 8/22/2019 8/22/2019   Trials Right Left   1  2  3 20 18   Average       3 Pt Pinch 8/22/2019 8/22/2019   Trials Right Left   1  2  3 19 12   Average         Edema:  []         None   [x]         Mild    []         Moderate      []         Severe                  Location: (R) IF and (L) MF   Edema - Measured circumferentially in cm  Date 7/1/19 7/11/19       Index Right Left Right Left Right Left Right Left Right Left Right Left                   P1               PIP 5.7 4.6 5.7             P2 5.3 5.5 5.2            DIP                 Edema - Measured circumferentially in cm  Date 7/11/19 7/16/19 8/8/19      Middle Right Left Right Left Right Left Right Left Right Left Right Left                   P1               PIP 5.9 6.7  6.4            P2 5.2 6.1  6.0  5.4         DIP     4.7 5.2             Palpation:  []         Normal        [x]       Tender       [x]      Mild         []       Moderate []       Severe     Location: (L) MF DIP    Sensation: []                   WNL throughout all nerve distributions; per patient report    [x]                   Decreased  []        Median    []        Ulnar    []        Radial nerve distribution   Patient reports numbness and tingling in the (R) IF and (L) MF tips      Home Exercise Program:  Tendon Glides  Blocking of PIP and DIP  Coban to reduce swelling  Gentle PROM of (L) MF PIP an DIP  8/15/19  Gentle  with sponge  And soft ball    Next Visit:   Paraffin  A/PROM of (L) MF all joints

## 2019-09-05 ENCOUNTER — THERAPY VISIT (OUTPATIENT)
Dept: OCCUPATIONAL THERAPY | Facility: CLINIC | Age: 54
End: 2019-09-05
Payer: COMMERCIAL

## 2019-09-05 DIAGNOSIS — M79.644 PAIN IN FINGER OF BOTH HANDS: ICD-10-CM

## 2019-09-05 DIAGNOSIS — S62.623D CLOSED DISPLACED FRACTURE OF MIDDLE PHALANX OF LEFT MIDDLE FINGER WITH ROUTINE HEALING, SUBSEQUENT ENCOUNTER: Primary | ICD-10-CM

## 2019-09-05 DIAGNOSIS — M79.645 PAIN IN FINGER OF BOTH HANDS: ICD-10-CM

## 2019-09-05 PROCEDURE — 97110 THERAPEUTIC EXERCISES: CPT | Mod: GO | Performed by: OCCUPATIONAL THERAPIST

## 2019-09-05 PROCEDURE — 97018 PARAFFIN BATH THERAPY: CPT | Mod: GO | Performed by: OCCUPATIONAL THERAPIST

## 2019-09-05 NOTE — PROGRESS NOTES
Progress Note Hand Therapy    Current Date: 9/5/19  Reporting period is 7/1/19 to 9/5/2019    Diagnosis: Left long finger middle phalanx closed neck fracture, displaced  DOI: 6/8/19  DOS: 6/28/19  Procedure:  1.  Closed reduction and percutaneous pinning of left long finger middle phalangeal fracture.  2.  Application of left hand intrinsic plus short arm splint  Post:  9w 6d    Rosalba Arredondo is a 53 year old right hand dominant female.    Patient reports symptoms of pain, stiffness/loss of motion, weakness/loss of strength, edema, numbness and tingling  of the right index finger and left middle finger which occurred due to an injury sustained when her fingers got caught in the hinges of a ladder.    Occupational Profile Information:  Current occupation is     S:  Subjective changes as noted by patient: I wacked the finger while reaching into the fridge crisper a few days ago and the finger is hurting in a different way.  Functional changes noted by patient:  Improvement in Self Care Tasks (dressing, bathing), Work Tasks, Recreational Activities and Household Chores  Patient has noted adverse reaction to:  None    Upper Extremity Functional Index Score:  SCORE:   Column Totals: /80: 64   (A lower score indicates greater disability.)      O:  Pain Level (Scale 0-10):   7/1/2019 7/11/19 7/16/19 8/8/19 8/15/2019 9/5/19   At Rest 2/10 2/10 2/10 0/10 0/10 1/10   With Use 4/10 4/10 4/10 1/10 1/10 1-2/10     Pain Description:  Date 7/1/2019 9/5/19   Location (R) index finger and (L) long finger (L) MF DIP joint   Pain Quality Throbbing and Tingling sharp   Frequency intermittent   occasional   Pain is worst  daytime daytime   Exacerbated by  movement movement   Relieved by cold, NSAIDs and rest rest   Progression Gradually improving Gradually improving      ROM:    Hand 7/1/2019 7/11/19 7/1/2019 7/11/19 7/16/19 8/8/19 8/15/2019   9/5/19   AROM(PROM) Right Right Left Left Left Left Left Left   Index MP /80  /80 /        PIP /80 /98 /        DIP /30 /50 /        HERNADEZ           Long MP /  /85 /85  /85 /90 95   PIP /  /35 +22/45 /60 /50 /75 /92 pre  /98 post   DIP / /70 pinned   -15/25 -13/45 -10/46 pre  /55 post   HERNADEZ           Ring MP /  /        PIP /  /        DIP /  /        HERNADEZ           Small MP /  /        PIP /  /        DIP /  /        HERNADEZ             Strength   Painfree (Measured in pounds)  Pain Report:  + mild    ++ moderate    +++ severe    8/22/2019 8/22/2019 9/5/19   Trials Right Left Left   1  2  3 72  66  67 51  44  44 51  45  42   Average 68 44 46     Lat Pinch 8/22/2019 8/22/2019 9/5/19   Trials Right Left Left   1  2  3 20 18 17   Average        3 Pt Pinch 8/22/2019 8/22/2019 9/5/19   Trials Right Left Left   1  2  3 19 12 12   Average          Edema:  []         None   [x]         Mild    []         Moderate      []         Severe                  Location: (R) IF and (L) MF   Edema - Measured circumferentially in cm  Date 7/1/19 7/11/19       Index Right Left Right Left Right Left Right Left Right Left Right Left                   P1               PIP 5.7 4.6 5.7             P2 5.3 5.5 5.2            DIP                 Edema - Measured circumferentially in cm  Date 7/11/19 7/16/19 8/8/19 9/5/19     Middle Right Left Right Left Right Left Right Left Right Left Right Left                   P1               PIP 5.9 6.7  6.4            P2 5.2 6.1  6.0  5.4  5.2       DIP     4.7 5.2  5.0           Palpation:  []         Normal        [x]       Tender       [x]      Mild         []       Moderate []       Severe     Location: (L) MF DIP    Sensation: [x]                   WNL throughout all nerve distributions; per patient report    []                   Decreased  []        Median    []        Ulnar    []        Radial nerve distribution     Assessment:  Response to therapy has been improvement to:  ROM of Fingers: MP joint - Flex, PIP joint - Flex, DIP joint - Ext, DIP joint - Flex  Flexibility:   tendon gliding is improved and improved excursion of involved muscles  Strength:   and pinch  Edema:  decreased  Pain:  frequency is less, intensity of pain is decreased, duration of pain is decreased and less tender over affected area  Paresthesias:  Median nerve - resolved    Overall Assessment:  Patient's symptoms are resolving.  Patient is ready to progress to more complex exercises.  Patient is becoming more independent in home exercise program  Patient would benefit from continued therapy to achieve rehab potential  STG/LTG:  STGoals have been reviewed and progress or achievement has occurred;  see goal sheet for details and updates.    I have re-evaluated this patient and find that the nature, scope, duration and intensity of the therapy is appropriate for the medical condition of the patient.  Plan:  Frequency/Duration:  Recommend continuing to see patient  1 X a month, once daily  for 3 months    Recommendations for Continued Therapy  Additions to Treatment Plan -  Therapeutic Exercise:  Isotonics and Stabilization      Home Exercise Program:  Tendon Glides  Blocking of PIP and DIP  Coban to reduce swelling  Gentle PROM of (L) MF PIP an DIP   and 3 point pinch strengthening    Next Visit:   Paraffin  A/PROM of (L) MF all joints  Strengthening

## 2019-10-03 ENCOUNTER — THERAPY VISIT (OUTPATIENT)
Dept: OCCUPATIONAL THERAPY | Facility: CLINIC | Age: 54
End: 2019-10-03
Payer: COMMERCIAL

## 2019-10-03 DIAGNOSIS — M79.644 PAIN IN FINGER OF BOTH HANDS: ICD-10-CM

## 2019-10-03 DIAGNOSIS — M79.645 PAIN IN FINGER OF BOTH HANDS: ICD-10-CM

## 2019-10-03 PROCEDURE — 97110 THERAPEUTIC EXERCISES: CPT | Mod: GO | Performed by: OCCUPATIONAL THERAPIST

## 2019-10-03 NOTE — PROGRESS NOTES
Discharge Note Hand Therapy    Current Date: 10/3/19  Initial Evaluation Date:  7/1/19  Reporting period is from 9/5/19 to 10/3/2019  Number of Visits:  8    Diagnosis: Left long finger middle phalanx closed neck fracture, displaced  DOI: 6/8/19  DOS: 6/28/19  Procedure:  1.  Closed reduction and percutaneous pinning of left long finger middle phalangeal fracture.  2.  Application of left hand intrinsic plus short arm splint  Post:  13w 6d    Rosalba Arredondo is a 53 year old right hand dominant female.      Occupational Profile Information:  Current occupation is     S:  Subjective changes as noted by patient: the finger has been hurting more but I am using it more. I have more pain now than I did after surgery.  Using the finger to type.  Functional changes noted by patient:  Improvement in Self Care Tasks (dressing, bathing), Work Tasks, Recreational Activities and Household Chores  Patient has noted adverse reaction to:  None    Upper Extremity Functional Index Score:  SCORE:   Column Totals: /80: 63   (A lower score indicates greater disability.)      O:  Pain Level (Scale 0-10):   7/1/2019 7/11/19 7/16/19 8/8/19 8/15/2019 9/5/19 10/3/19   At Rest 2/10 2/10 2/10 0/10 0/10 1/10 0/10   With Use 4/10 4/10 4/10 1/10 1/10 1-2/10 3/10     Pain Description:  Date 7/1/2019 9/5/19   Location (R) index finger and (L) long finger (L) MF DIP joint   Pain Quality Throbbing and Tingling sharp   Frequency intermittent   occasional   Pain is worst  daytime daytime   Exacerbated by  movement movement   Relieved by cold, NSAIDs and rest rest   Progression Gradually improving Gradually improving      ROM:    Hand 7/1/2019 7/11/19 7/1/2019 7/11/19 7/16/19 8/8/19 8/15/2019   9/5/19 10/3/19   AROM(PROM) Right Right Left Left Left Left Left Left Left   Index MP /80 /80 /         PIP /80 /98 /         DIP /30 /50 /         HERNADEZ            Long MP /  /85 /85  /85 /90 95 /95   PIP /  /35 +22/45 /60 /50 /75 /92 pre  /98 post  +23/95   DIP / /70 pinned   -15/25 -13/45 -10/46 pre  /55 post -12(0)/57 pre  /63 post   HERNADEZ            Ring MP /  /         PIP /  /         DIP /  /         HERNADEZ            Small MP /  /         PIP /  /         DIP /  /         HERNADEZ              Strength   Painfree (Measured in pounds)  Pain Report:  + mild    ++ moderate    +++ severe    8/22/2019 8/22/2019 9/5/19 10/3/19   Trials Right Left Left Left   1  2  3 72  66  67 51  44  44 51  45  42 64  57  59   Average 68 44 46 60     Lat Pinch 8/22/2019 8/22/2019 9/5/19   Trials Right Left Left   1  2  3 20 18 17   Average        3 Pt Pinch 8/22/2019 8/22/2019 9/5/19 10/3/19   Trials Right Left Left Left   1  2  3 19 12 12 13   Average           Edema:  []         None   [x]         Mild    []         Moderate      []         Severe                  Location: (R) IF and (L) MF   Edema - Measured circumferentially in cm  Date 7/1/19 7/11/19       Index Right Left Right Left Right Left Right Left Right Left Right Left                   P1               PIP 5.7 4.6 5.7 5.7            P2 5.3 5.5 5.2 5.2           DIP                 Edema - Measured circumferentially in cm  Date 7/11/19 7/16/19 8/8/19 9/5/19 10/2/19    Middle Right Left Right Left Right Left Right Left Right Left Right Left                   P1               PIP 5.9 6.7  6.4            P2 5.2 6.1  6.0  5.4  5.2  5.1     DIP     4.7 5.2  5.0  4.8         Palpation:  []         Normal        [x]       Tender       [x]      Mild         []       Moderate []       Severe     Location: (L) MF DIP      Assessment:  Overall Assessment:  Patient's progress has slowed.  Patient is ready to be discharged from therapy and continue their home treatment program.  STG/LTG:  See goal sheet for details and updates.    Plan:  Frequency/Duration:  Discharge from Hand Therapy; continue home program.    Recommendations for Home Program -   Home Exercise Program:  Tendon Glides  Blocking of PIP and DIP  Coban to reduce  swelling  PROM of (L) MF PIP an DIP   and 3 point pinch strengthening

## 2020-01-13 ENCOUNTER — OFFICE VISIT (OUTPATIENT)
Dept: VASCULAR SURGERY | Facility: CLINIC | Age: 55
End: 2020-01-13
Payer: COMMERCIAL

## 2020-01-13 DIAGNOSIS — M79.605 PAIN IN BOTH LOWER EXTREMITIES: ICD-10-CM

## 2020-01-13 DIAGNOSIS — M79.604 PAIN IN BOTH LOWER EXTREMITIES: ICD-10-CM

## 2020-01-13 DIAGNOSIS — I78.1 TELANGIECTASIA: Primary | ICD-10-CM

## 2020-01-13 PROCEDURE — 99202 OFFICE O/P NEW SF 15 MIN: CPT | Performed by: SURGERY

## 2020-01-13 ASSESSMENT — ENCOUNTER SYMPTOMS
SPUTUM PRODUCTION: 1
CARDIOVASCULAR NEGATIVE: 1
GASTROINTESTINAL NEGATIVE: 1
PSYCHIATRIC NEGATIVE: 1
BRUISES/BLEEDS EASILY: 1
BACK PAIN: 1
NEUROLOGICAL NEGATIVE: 1
EYES NEGATIVE: 1

## 2020-01-13 NOTE — LETTER
1/13/2020         RE: Rosalba Arredondo  [address] [place] [address]        Dear Colleague,    Thank you for referring your patient, Rosalba Arredondo, [facility] [place]. Please see a copy of my visit note below.    [facility]    HPI:    Rosalba Arredondo is a pleasant 54 year old female who presents with complaints of recurrent, bilateral lower extremity pain and spider veins.  Spider veins have been present for about 10 years.  She was seen in 2013 regarding the spider veins and lower extremity pain and swelling, underwent ultrasonography and then had her left great saphenous vein ablated elsewhere.  She also underwent several sessions of sclerotherapy.    She returns now because the aching of her legs seems to have increased though she has not had significant increased swelling.  She has not noticed any visible varicose veins but does admit that a few spider veins have formed since her treatment in 2013.  She has been wearing compression hose on a regular basis.  The pain is described as an aching, tiredness, heaviness, improving with compression hose.  The patient has used compression hose for years.    She has no history of deep vein thrombosis or superficial thrombophlebitis but does admit that her mother has had superficial phlebitis.  She has a strong family history of varicose veins.    PAST MEDICAL HISTORY: History reviewed. No pertinent past medical history.    PAST SURGICAL HISTORY:   Past Surgical History:   Procedure Laterality Date     CLOSED REDUCTION, PERCUTANEOUS PINNING FINGER, COMBINED Left 6/28/2019    Procedure: CLOSED REDUCTION WITH PERCUTANEOUS PINNING OF LEFT LONG FINGER;  Surgeon: Rafael Jones MD;  Location: [facility] OR       FAMILY HISTORY: History reviewed. No pertinent family history.    SOCIAL HISTORY:   Social History     Tobacco Use     Smoking status: Never Smoker     Smokeless tobacco: Never Used   Substance Use Topics     Alcohol  use: Yes     Frequency: Never     Comment: occasionally       REVIEW OF SYSTEMS: Review Of Systems      Vital signs:  There were no vitals taken for this visit.    Current Outpatient Medications   Medication Sig Dispense Refill     FLUoxetine (PROZAC) 20 MG capsule   2     Multiple Vitamin (MULTI-VITAMINS) TABS        Progesterone Micronized (PROGESTERONE PO)          PHYSICAL EXAM:  General: Pleasant, NAD.   HEENT: Normocephalic, atraumatic, external ears and nose normal.   Respiratory: Normal respiratory effort.   Cardiovascular: Pulse is regular.   Musculoskeletal: Gait and station normal.  The joints of her fingers and toes without deformity.  There is no cyanosis of her nailbeds.   EXTREMITIES: Right lower extremity: A few scattered reticular veins and telangiectasias primarily in the right popliteal fossa.  No varicose veins are appreciated.  There is a minimal sock line in the distal third of the right leg but no significant edema is appreciated.  There are no venous stasis changes.  Left lower extremity: Scattered reticular veins and a few more telangiectasias in the left popliteal fossa but no varicose veins are appreciated.  She has a few scattered purple telangiectasias about the proximal medial left calf with a few scattered, red telangiectasias from her previous treatment.  No significant edema is appreciated though she does have a mild sock line in the distal third of her left leg.  No venous stasis changes..    PULSES: R/L (3=normal pulse, 0=no palpable pulse) dorsalis pedis: 3/3; posterior tibial: 3/3.      Neurologic: Grossly normal  Psychiatric: Mood, affect, judgment and insight are normal     ASSESSMENT:  The tiredness and heaviness of her legs is not likely to be from significant underlying venous insufficiency.  In general, she would have visible varicose veins and other signs such as swelling.  She is wearing compression and has good control of the symptoms.    We discussed the option of  obtaining an ultrasound to assess for underlying superficial or deep venous insufficiency.  She wishes to follow this conservatively at this time and will be in touch if her symptoms change or if she wishes to have follow-up with ultrasound.  She voiced understanding and her questions were answered.    PLAN:  Conservative management with exercise, elevation, compression and dietary measures.  She will return on an as-needed basis.     Johan Watson MD              Review of Systems   Constitutional:        Fatigue   HENT: Negative.    Eyes: Negative.    Respiratory: Positive for sputum production.    Cardiovascular: Negative.    Gastrointestinal: Negative.    Genitourinary:        Bladder infection   Musculoskeletal: Positive for back pain.   Skin: Negative.    Neurological: Negative.    Endo/Heme/Allergies: Bruises/bleeds easily.   Psychiatric/Behavioral: Negative.        [facility] PATIENT:            Again, thank you for allowing me to participate in the care of your patient.        Sincerely,        Johan Watson MD

## 2020-01-13 NOTE — PROGRESS NOTES
[facility]    HPI:    Rosalba Arredondo is a pleasant 54 year old female who presents with complaints of recurrent, bilateral lower extremity pain and spider veins.  Spider veins have been present for about 10 years.  She was seen in 2013 regarding the spider veins and lower extremity pain and swelling, underwent ultrasonography and then had her left great saphenous vein ablated elsewhere.  She also underwent several sessions of sclerotherapy.    She returns now because the aching of her legs seems to have increased though she has not had significant increased swelling.  She has not noticed any visible varicose veins but does admit that a few spider veins have formed since her treatment in 2013.  She has been wearing compression hose on a regular basis.  The pain is described as an aching, tiredness, heaviness, improving with compression hose.  The patient has used compression hose for years.    She has no history of deep vein thrombosis or superficial thrombophlebitis but does admit that her mother has had superficial phlebitis.  She has a strong family history of varicose veins.    PAST MEDICAL HISTORY: History reviewed. No pertinent past medical history.    PAST SURGICAL HISTORY:   Past Surgical History:   Procedure Laterality Date     CLOSED REDUCTION, PERCUTANEOUS PINNING FINGER, COMBINED Left 6/28/2019    Procedure: CLOSED REDUCTION WITH PERCUTANEOUS PINNING OF LEFT LONG FINGER;  Surgeon: Rafael Jones MD;  Location: [facility]       FAMILY HISTORY: History reviewed. No pertinent family history.    SOCIAL HISTORY:   Social History     Tobacco Use     Smoking status: Never Smoker     Smokeless tobacco: Never Used   Substance Use Topics     Alcohol use: Yes     Frequency: Never     Comment: occasionally       REVIEW OF SYSTEMS: Review Of Systems      Vital signs:  There were no vitals taken for this visit.    Current Outpatient Medications   Medication Sig Dispense Refill     FLUoxetine (PROZAC) 20 MG  capsule   2     Multiple Vitamin (MULTI-VITAMINS) TABS        Progesterone Micronized (PROGESTERONE PO)          PHYSICAL EXAM:  General: Pleasant, NAD.   HEENT: Normocephalic, atraumatic, external ears and nose normal.   Respiratory: Normal respiratory effort.   Cardiovascular: Pulse is regular.   Musculoskeletal: Gait and station normal.  The joints of her fingers and toes without deformity.  There is no cyanosis of her nailbeds.   EXTREMITIES: Right lower extremity: A few scattered reticular veins and telangiectasias primarily in the right popliteal fossa.  No varicose veins are appreciated.  There is a minimal sock line in the distal third of the right leg but no significant edema is appreciated.  There are no venous stasis changes.  Left lower extremity: Scattered reticular veins and a few more telangiectasias in the left popliteal fossa but no varicose veins are appreciated.  She has a few scattered purple telangiectasias about the proximal medial left calf with a few scattered, red telangiectasias from her previous treatment.  No significant edema is appreciated though she does have a mild sock line in the distal third of her left leg.  No venous stasis changes..    PULSES: R/L (3=normal pulse, 0=no palpable pulse) dorsalis pedis: 3/3; posterior tibial: 3/3.      Neurologic: Grossly normal  Psychiatric: Mood, affect, judgment and insight are normal     ASSESSMENT:  The tiredness and heaviness of her legs is not likely to be from significant underlying venous insufficiency.  In general, she would have visible varicose veins and other signs such as swelling.  She is wearing compression and has good control of the symptoms.    We discussed the option of obtaining an ultrasound to assess for underlying superficial or deep venous insufficiency.  She wishes to follow this conservatively at this time and will be in touch if her symptoms change or if she wishes to have follow-up with ultrasound.  She voiced  understanding and her questions were answered.    PLAN:  Conservative management with exercise, elevation, compression and dietary measures.  She will return on an as-needed basis.     Johan Watson MD              Review of Systems   Constitutional:        Fatigue   HENT: Negative.    Eyes: Negative.    Respiratory: Positive for sputum production.    Cardiovascular: Negative.    Gastrointestinal: Negative.    Genitourinary:        Bladder infection   Musculoskeletal: Positive for back pain.   Skin: Negative.    Neurological: Negative.    Endo/Heme/Allergies: Bruises/bleeds easily.   Psychiatric/Behavioral: Negative.        [facility] NEW PATIENT:

## 2020-03-11 ENCOUNTER — HEALTH MAINTENANCE LETTER (OUTPATIENT)
Age: 55
End: 2020-03-11

## 2021-01-03 ENCOUNTER — HEALTH MAINTENANCE LETTER (OUTPATIENT)
Age: 56
End: 2021-01-03

## 2021-04-25 ENCOUNTER — HEALTH MAINTENANCE LETTER (OUTPATIENT)
Age: 56
End: 2021-04-25

## 2021-06-20 ENCOUNTER — HEALTH MAINTENANCE LETTER (OUTPATIENT)
Age: 56
End: 2021-06-20

## 2021-08-17 NOTE — OP NOTE
DATE OF OPERATION: June 28, 2019    PREOPERATIVE DIAGNOSIS: Left long finger middle phalanx closed neck fracture, displaced.    POSTOPERATIVE DIAGNOSIS: Left long finger middle phalanx closed neck fracture, displaced.    PROCEDURES PERFORMED:   1.  Closed reduction and percutaneous pinning of left long finger middle phalangeal fracture.  2.  Application of left hand intrinsic plus short arm splint.    SURGEON: Rafael Jones MD    ASSISTANT: None.    ANESTHESIA: General.    ESTIMATED BLOOD LOSS: 1 mL    TOURNIQUET TIME: 0 min    FINDINGS: Adequate reduction on fluoroscopic imaging.    SPECIMENS: None.    COMPLICATIONS: None.    DRAINS: None.    IMPLANTS: 0.045 K wires x2.    INDICATIONS: Patient is a 53-year-old female who sustained crush injuries to the fingertips bilaterally when a ladder closed and on her fingers at home.  On the left side, she sustained a long finger middle phalangeal fracture that was displaced and causing deformity that was visible in addition to difficulty with DIP motion.  Risks benefits and alternatives were discussed with the patient regarding closed reduction percutaneous pinning versus open reduction internal fixation of this fracture.  I explained the risks to include bleeding, infection, injury to surrounding structures, chronic pain, chronic stiffness, malunion, nonunion, asymmetry, contour deformity, and need for revision surgery.  Patient accepted the associated risks and wished to proceed with surgery.    DESCRIPTION OF PROCEDURE: Informed consent was obtained in the preoperative holding area.  Left long finger was marked.  Patient was then taken to the operating room and placed in supine position with her left hand out on a hand table.  Preoperative antibiotics were given.  Bilateral lower leg SCDs were placed and all pressure points well-padded.  General anesthesia was obtained.  Lead was placed over the patient's body.  A tourniquet was applied to the left upper arm and left  Goal Outcome Evaluation:  Plan of Care Reviewed With: patient           Outcome Summary: PT able to lightly debride a minimal amount of crusted, callused skin to help improve healing potential.     upper extremity was then prepped and draped circumferentially in a sterile fashion.  The tourniquet was not inflated during the surgery.  A timeout was performed.    Utilizing mini C-arm fluoroscopy, the fracture was visualized.  By placing distraction force in a dorsal to volar force on the distal fragment, the fracture could be reduced under fluoroscopic imaging.  The decision was made to perform closed reduction percutaneous pinning.  0.05 K wire was first placed on the left long finger middle phalanx radial base to be driven anterograde into the shaft of the middle phalanx.  The K wire was driven almost up to the point of the fracture.  Similar fashion, another 0.045 K wire was driven from the ulnar base of the middle phalanx all the way up to the fracture site.  The fracture was then reduced applying the after mentioned forces and a K wire was driven through the distal fragment from proximal to distal.  Adequate reduction and stabilization was confirmed on fluoroscopic imaging.  The K wires were told out from distal so that the pins were buried proximally.  Final images were obtained on C-arm imaging.  The K wires were shortened and balls applied to the distal tips.  Soft dressings were applied to the pin sites.  A dorsally based short arm splint was applied to keep the hand in intrinsic plus and protect the pin sites.  All fingertips are pink and well-perfused at the end of the case.  All counts were correct at the end of the case.  The patient was then awakened and transferred to the postoperative area in stable condition.    DISPOSITION: Home.

## 2021-10-10 ENCOUNTER — HEALTH MAINTENANCE LETTER (OUTPATIENT)
Age: 56
End: 2021-10-10

## 2022-09-18 ENCOUNTER — HEALTH MAINTENANCE LETTER (OUTPATIENT)
Age: 57
End: 2022-09-18

## 2023-01-08 ENCOUNTER — OFFICE VISIT (OUTPATIENT)
Dept: URGENT CARE | Facility: URGENT CARE | Age: 58
End: 2023-01-08
Payer: COMMERCIAL

## 2023-01-08 VITALS
HEART RATE: 95 BPM | TEMPERATURE: 98.9 F | DIASTOLIC BLOOD PRESSURE: 72 MMHG | OXYGEN SATURATION: 96 % | WEIGHT: 177.4 LBS | BODY MASS INDEX: 29.52 KG/M2 | SYSTOLIC BLOOD PRESSURE: 106 MMHG

## 2023-01-08 DIAGNOSIS — R50.9 FEVER, UNSPECIFIED FEVER CAUSE: Primary | ICD-10-CM

## 2023-01-08 DIAGNOSIS — R19.7 DIARRHEA, UNSPECIFIED TYPE: ICD-10-CM

## 2023-01-08 DIAGNOSIS — R10.13 EPIGASTRIC PAIN: ICD-10-CM

## 2023-01-08 DIAGNOSIS — R10.31 RLQ ABDOMINAL PAIN: ICD-10-CM

## 2023-01-08 LAB
DEPRECATED S PYO AG THROAT QL EIA: NEGATIVE
FLUAV AG SPEC QL IA: NEGATIVE
FLUBV AG SPEC QL IA: NEGATIVE
GROUP A STREP BY PCR: NOT DETECTED

## 2023-01-08 PROCEDURE — 87804 INFLUENZA ASSAY W/OPTIC: CPT | Performed by: PHYSICIAN ASSISTANT

## 2023-01-08 PROCEDURE — 99203 OFFICE O/P NEW LOW 30 MIN: CPT | Performed by: PHYSICIAN ASSISTANT

## 2023-01-08 PROCEDURE — 87651 STREP A DNA AMP PROBE: CPT | Performed by: PHYSICIAN ASSISTANT

## 2023-01-08 NOTE — PATIENT INSTRUCTIONS
57-year-old female presents for fever, chills, abdominal pain, diarrhea, dry heaves for over 48 hours. 15-20 episodes of diarrhea a day.   No blood in stool.  No close contacts with similar illness.  Minimal cough.  No sore throat.  Does have dry heaves with it. Temp this morning was 102.  Did take ibuprofen.  2 negative home COVID tests.  Lots of body aches.  No headache or dizziness.  No dysuria, frequency, urgency.  Abdominal exam shows moderate tenderness epigastric area and mild tenderness right lower quadrant. Limited on labs and imaging. Also do not have IV fluid treatment here.To ER for evaluation and treatment.    Differential includes but is not limited to pancreatitis,  cholecystitis, hepatitis, PUD, bowel obstruction, mesenteric adenitis, appendicitis, ovarian cyst, gastroenteritis, acute cystitis, kidney stone.    Strep and influenza are pending.

## 2023-01-08 NOTE — PROGRESS NOTES
Chief Complaint   Patient presents with     Urgent Care     Fever     Chills     Cough     Abdominal Pain                 ASSESSMENT:     ICD-10-CM    1. Fever, unspecified fever cause  R50.9 Streptococcus A Rapid Screen w/Reflex to PCR - Clinic Collect     Influenza A & B Antigen - Clinic Collect      2. Epigastric pain  R10.13       3. RLQ abdominal pain  R10.31       4. Diarrhea, unspecified type  R19.7                 PLAN:57-year-old female presents for fever, chills, abdominal pain, diarrhea, dry heaves for over 48 hours. 15-20 episodes of diarrhea a day.   No blood in stool.  No close contacts with similar illness.  Minimal cough.  No sore throat.  Does have dry heaves with it. Temp this morning was 102.  Did take ibuprofen.  2 negative home COVID tests.  Lots of body aches.  No headache or dizziness.  No dysuria, frequency, urgency.  Abdominal exam shows moderate tenderness epigastric area and mild tenderness right lower quadrant. Limited on labs and imaging. Also do not have IV fluid treatment here.To ER for evaluation and treatment.    Differential includes but is not limited to pancreatitis,  cholecystitis, hepatitis, PUD, bowel obstruction, mesenteric adenitis, appendicitis, ovarian cyst, gastroenteritis, acute cystitis, kidney stone.    Strep and influenza are pending.      Opal Darden PA-C      SUBJECTIVE:  57-year-old female presents for fever, chills, abdominal pain, diarrhea, dry heaves for 48 hours.  No sore throat.  Very minimal cough.  Body aches.  No headache or dizziness.  Temp this morning 102 took ibuprofen.  No close contacts with similar illness.  2 negative COVID tests today.  Diarrhea and abdominal pain bother her the most.  No dysuria, frequency, urgency, flank pain.    Rates the abdominal pain 6 out of 10.  Colonoscopy 2015- report states normal, repeat in 10 yrs    Nonsmoker.   No past h/o gallstones or kidney stones.    FLUoxetine (PROZAC) 20 MG capsule,   Multiple Vitamin  (MULTI-VITAMINS) TABS,   Progesterone Micronized (PROGESTERONE PO),     No current facility-administered medications on file prior to visit.      Social History     Tobacco Use     Smoking status: Never     Smokeless tobacco: Never   Substance Use Topics     Alcohol use: Yes     Comment: occasionally       ROS:  CONSTITUTIONAL: Negative for fatigue or fever.  EYES: Negative for eye problems.  ENT: As above.  RESP: As above.  CV: Negative for chest pains.  GI: Negative for vomiting.  MUSCULOSKELETAL:  Negative for significant muscle or joint pains.  NEUROLOGIC: Negative for headaches.  SKIN: Negative for rash.  PSYCH: Normal mentation for age.    OBJECTIVE:    /72 (BP Location: Left arm, Patient Position: Sitting, Cuff Size: Adult Regular)   Pulse 95   Temp 98.9  F (37.2  C) (Tympanic)   Wt 80.5 kg (177 lb 6.4 oz)   SpO2 96%   BMI 29.52 kg/m      GENERAL APPEARANCE: Healthy, alert and no distress.  EYES:Conjunctiva/sclera clear.  EARS: No cerumen.   Ear canals w/o erythema.  TM's intact w/o erythema.    NOSE/MOUTH: Nose without ulcers, erythema or lesions.  SINUSES: No maxillary sinus tenderness.  THROAT: No erythema w/o tonsillar enlargement . No exudates.  NECK: Supple, nontender, no lymphadenopathy.  RESP: Lungs clear to auscultation - no rales, rhonchi or wheezes  CV: Regular rate and rhythm, normal S1 S2, no murmur noted.  NEURO: Awake, alert    SKIN: No rashes  Abdomen-normal active bowel sounds.  Moderate epigastric tenderness.  Mild right lower quadrant tenderness.  No rebound, guarding, rigidity.      Opal Darden PA-C

## 2023-01-29 ENCOUNTER — HEALTH MAINTENANCE LETTER (OUTPATIENT)
Age: 58
End: 2023-01-29

## 2023-07-30 ENCOUNTER — HEALTH MAINTENANCE LETTER (OUTPATIENT)
Age: 58
End: 2023-07-30

## 2024-02-25 ENCOUNTER — HEALTH MAINTENANCE LETTER (OUTPATIENT)
Age: 59
End: 2024-02-25

## 2024-08-19 ENCOUNTER — OFFICE VISIT (OUTPATIENT)
Dept: URGENT CARE | Facility: URGENT CARE | Age: 59
End: 2024-08-19
Payer: COMMERCIAL

## 2024-08-19 VITALS
WEIGHT: 175.3 LBS | HEART RATE: 60 BPM | OXYGEN SATURATION: 97 % | SYSTOLIC BLOOD PRESSURE: 117 MMHG | DIASTOLIC BLOOD PRESSURE: 75 MMHG | TEMPERATURE: 98.5 F | BODY MASS INDEX: 29.17 KG/M2 | RESPIRATION RATE: 16 BRPM

## 2024-08-19 DIAGNOSIS — H61.21 IMPACTED CERUMEN OF RIGHT EAR: Primary | ICD-10-CM

## 2024-08-19 PROCEDURE — 69209 REMOVE IMPACTED EAR WAX UNI: CPT | Mod: RT | Performed by: PHYSICIAN ASSISTANT

## 2024-08-19 NOTE — PATIENT INSTRUCTIONS
Debrox and mineral oil drops can help loosen wax.     Can use bulb syringe with warm water to flush the ear.

## 2024-08-19 NOTE — PROGRESS NOTES
Assessment & Plan     Impacted cerumen of right ear  Flushed with clearance of wax and resolution of pain. Recommend OTC Debrox drops or mineral oil to help keep wax loose and warm water flush with bulb syringe.  - DC REMOVAL IMPACTED CERUMEN IRRIGATION/LVG UNILAT    Return in about 1 week (around 8/26/2024) for with primary care if not improving.     Maru Fernandez PA-C  [facility]    Subjective   Rosalba Arredondo is a 58 year old who presents for the following health issues     Patient presents with:  Otalgia: Right ear pain for about a week. Ear feels plugged.      HPI    Presents to  with R ear pain.    A couple weeks ago she reports having a build up of ear wax in the R ear, she tried using OTC ear drops and peroxide with some wax removal.    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    She began experiencing pain in the R ear today.     Denies fevers.         Review of Systems   ROS negative except as stated above.      Objective    /75 (BP Location: Left arm, Patient Position: Sitting, Cuff Size: Adult Regular)   Pulse 60   Temp 98.5  F (36.9  C) (Tympanic)   Resp 16   Wt 79.5 kg (175 lb 4.8 oz)   SpO2 97%   BMI 29.17 kg/m    Physical Exam   GENERAL: alert and no distress  HENT: Right ear canal with impacted cerumen. The ear was gently flushed with peroxide diluted with warm water by Nehemias Waddell RN, with successful removal of cerumen. TM was then visualized- post flush TM normal: no effusions or erythema, and normal landmarks, small lesion in canal; left ear: normal: no effusions, no erythema, normal landmarks

## 2025-03-15 ENCOUNTER — HEALTH MAINTENANCE LETTER (OUTPATIENT)
Age: 60
End: 2025-03-15

## 2025-08-09 ENCOUNTER — HEALTH MAINTENANCE LETTER (OUTPATIENT)
Age: 60
End: 2025-08-09

## (undated) DEVICE — GLOVE PROTEXIS BLUE W/NEU-THERA 7.5  2D73EB75

## (undated) DEVICE — DRAPE STERI TOWEL SM 1000

## (undated) DEVICE — DRSG GAUZE 4X4" 3033

## (undated) DEVICE — BNDG KLING 2" 2231

## (undated) DEVICE — DRAPE C-ARM MINI 5423

## (undated) DEVICE — BNDG ELASTIC 4"X5YDS UNSTERILE 6611-40

## (undated) DEVICE — CAST PADDING 3" STERILE 9043S

## (undated) DEVICE — SOL WATER IRRIG 1000ML BOTTLE 07139-09

## (undated) DEVICE — CAST PLASTER SPLINT 4X15" 7394

## (undated) DEVICE — NDL 19GA 1.5"

## (undated) DEVICE — GLOVE PROTEXIS POWDER FREE 7.5 ORTHOPEDIC 2D73ET75

## (undated) DEVICE — PACK HAND WRIST SOP15HWFSP

## (undated) DEVICE — SOL ADH LIQUID BENZOIN SWAB 0.6ML C1544

## (undated) DEVICE — PREP CHLORAPREP 26ML TINTED ORANGE  260815

## (undated) RX ORDER — CEFAZOLIN SODIUM 2 G/100ML
INJECTION, SOLUTION INTRAVENOUS
Status: DISPENSED
Start: 2019-06-28

## (undated) RX ORDER — LIDOCAINE HYDROCHLORIDE 10 MG/ML
INJECTION, SOLUTION EPIDURAL; INFILTRATION; INTRACAUDAL; PERINEURAL
Status: DISPENSED
Start: 2019-06-28

## (undated) RX ORDER — GABAPENTIN 300 MG/1
CAPSULE ORAL
Status: DISPENSED
Start: 2019-06-28

## (undated) RX ORDER — ACETAMINOPHEN 325 MG/1
TABLET ORAL
Status: DISPENSED
Start: 2019-06-28

## (undated) RX ORDER — DEXAMETHASONE SODIUM PHOSPHATE 4 MG/ML
INJECTION, SOLUTION INTRA-ARTICULAR; INTRALESIONAL; INTRAMUSCULAR; INTRAVENOUS; SOFT TISSUE
Status: DISPENSED
Start: 2019-06-28

## (undated) RX ORDER — FENTANYL CITRATE 50 UG/ML
INJECTION, SOLUTION INTRAMUSCULAR; INTRAVENOUS
Status: DISPENSED
Start: 2019-06-28

## (undated) RX ORDER — OXYCODONE HYDROCHLORIDE 5 MG/1
TABLET ORAL
Status: DISPENSED
Start: 2019-06-28

## (undated) RX ORDER — LIDOCAINE HYDROCHLORIDE 10 MG/ML
INJECTION, SOLUTION EPIDURAL; INFILTRATION; INTRACAUDAL; PERINEURAL
Status: DISPENSED
Start: 2019-08-06

## (undated) RX ORDER — ONDANSETRON 2 MG/ML
INJECTION INTRAMUSCULAR; INTRAVENOUS
Status: DISPENSED
Start: 2019-06-28

## (undated) RX ORDER — PROPOFOL 10 MG/ML
INJECTION, EMULSION INTRAVENOUS
Status: DISPENSED
Start: 2019-06-28